# Patient Record
Sex: FEMALE | Race: WHITE | Employment: OTHER | URBAN - METROPOLITAN AREA
[De-identification: names, ages, dates, MRNs, and addresses within clinical notes are randomized per-mention and may not be internally consistent; named-entity substitution may affect disease eponyms.]

---

## 2020-04-23 ENCOUNTER — OFFICE VISIT (OUTPATIENT)
Dept: GYNECOLOGY | Age: 75
End: 2020-04-23

## 2020-04-23 VITALS
HEIGHT: 66 IN | DIASTOLIC BLOOD PRESSURE: 77 MMHG | SYSTOLIC BLOOD PRESSURE: 125 MMHG | TEMPERATURE: 97.7 F | WEIGHT: 152.8 LBS | BODY MASS INDEX: 24.56 KG/M2 | HEART RATE: 62 BPM

## 2020-04-23 DIAGNOSIS — R97.8 ELEVATED CA 19-9 LEVEL: ICD-10-CM

## 2020-04-23 DIAGNOSIS — E03.9 ACQUIRED HYPOTHYROIDISM: ICD-10-CM

## 2020-04-23 DIAGNOSIS — R97.0 ELEVATED CEA: ICD-10-CM

## 2020-04-23 DIAGNOSIS — R19.09 ABDOMINAL MASS OF OTHER SITE: ICD-10-CM

## 2020-04-23 DIAGNOSIS — R19.00 PELVIC MASS: Primary | ICD-10-CM

## 2020-04-23 DIAGNOSIS — I10 ESSENTIAL HYPERTENSION: ICD-10-CM

## 2020-04-23 RX ORDER — CALCIUM CARBONATE 300MG(750)
400 TABLET,CHEWABLE ORAL DAILY
COMMUNITY

## 2020-04-23 RX ORDER — PAROXETINE HYDROCHLORIDE 20 MG/1
20 TABLET, FILM COATED ORAL EVERY MORNING
COMMUNITY

## 2020-04-23 RX ORDER — GLUCOSAMINE SULFATE 1500 MG
1000 POWDER IN PACKET (EA) ORAL DAILY
COMMUNITY

## 2020-04-23 RX ORDER — OXYBUTYNIN CHLORIDE 10 MG/1
10 TABLET, EXTENDED RELEASE ORAL DAILY
COMMUNITY

## 2020-04-23 RX ORDER — LEVOTHYROXINE SODIUM 75 UG/1
TABLET ORAL
COMMUNITY
Start: 2020-03-09

## 2020-04-23 NOTE — PATIENT INSTRUCTIONS
[employer] Activation Thank you for requesting access to [employer]. Please follow the instructions below to securely access and download your online medical record. [employer] allows you to send messages to your doctor, view your test results, renew your prescriptions, schedule appointments, and more. How Do I Sign Up? 1. In your internet browser, go to [email or web address]. 2. Click on the First Time User? Click Here link in the Sign In box. You will see the New Member Sign Up page. 3. Enter your [employer] Access Code exactly as it appears below. You will not need to use this code after youve completed the sign-up process. If you do not sign up before the expiration date, you must request a new code. [employer] Access Code: [email or web address] Expires: 2020 11:40 AM (This is the date your [employer] access code will ) 4. Enter the last four digits of your Social Security Number (xxxx) and Date of Birth (mm/dd/yyyy) as indicated and click Submit. You will be taken to the next sign-up page. 5. Create a [employer] ID. This will be your [employer] login ID and cannot be changed, so think of one that is secure and easy to remember. 6. Create a [employer] password. You can change your password at any time. 7. Enter your Password Reset Question and Answer. This can be used at a later time if you forget your password. 8. Enter your e-mail address. You will receive e-mail notification when new information is available in [address] 9. Click Sign Up. You can now view and download portions of your medical record. 10. Click the Download Summary menu link to download a portable copy of your medical information. Additional Information If you have questions, please visit the Frequently Asked Questions section of the [employer] website at [email or web address] Remember, [employer] is NOT to be used for urgent needs. For medical emergencies, dial 911.

## 2020-04-23 NOTE — PROGRESS NOTES
New Patient, Referred by Dr. Edil Grove for pelvic mass, she states she is not having any pain, she has been bleeding from the vagina since September 2019, she states it is bright red blood and it is \"like my perios\", no clots, no nausea or vomiting, no fever or chills    1. Have you been to the ER, urgent care clinic since your last visit? Hospitalized since your last visit?  no    2. Have you seen or consulted any other health care providers outside of the [address] [place] [address] since your last visit? Include any pap smears or colon screening.   Yes, Dr. Solo Shall

## 2020-04-23 NOTE — LETTER
4/24/2020 12:23 PM 
 
Patient:  Justin Calderón YOB: 1945 Date of Visit: 4/23/2020 [facility], [address] [place] [address] VIA Facsimile: [phone] Dear Alexi Valiente DO, Thank you for referring Ms. Dane Cortez to [address] [place] [address] for evaluation and treatment. Please see my office note for my full discussion with the patient. Her case is interesting. In short I am concerned for a GI primary with metastatic disease based on her tumor markers. She will require further workup. We will plan for surgical resection after a CT scan and colonoscopy. If she has a primary GI malignancy we will plan for a joint procedure. Thank you very much for your referral of Ms. Dane Cortez. If you have questions, please do not hesitate to call me. I look forward to following Ms. Janelle Torres along with you and will keep you updated as to her progress.   
 
 
 
 
Sincerely, 
 
 
Shena Valdivia MD

## 2020-04-24 PROBLEM — R97.8 ELEVATED CA 19-9 LEVEL: Status: ACTIVE | Noted: 2020-04-24

## 2020-04-24 PROBLEM — R97.0 ELEVATED CEA: Status: ACTIVE | Noted: 2020-04-24

## 2020-04-24 PROBLEM — N95.0 PMB (POSTMENOPAUSAL BLEEDING): Status: ACTIVE | Noted: 2020-04-24

## 2020-04-24 PROBLEM — R19.00 PELVIC MASS: Status: ACTIVE | Noted: 2020-04-24

## 2020-04-24 NOTE — PROGRESS NOTES
[address] [facility] [address]  P [phone]  F [phone]    Office Note  Patient ID:  Name:  Jabier Jordan  MRN:  [id number]  :  1945/75 y.o. Date:  2020      HISTORY OF PRESENT ILLNESS:  Ms. Jabier Jodran is a 76 y.o.  postmenopausal female who presents in consultation from Dr. Nupur Elaine for PMB and large 25cm pelvic mass. The patient reports vaginal spotting that started in 2019 and has continued since that time. Denies bleeding like a period or everyday. Denies abdominal pain. Reports some bloating. Denies nausea or vomiting. Reports some constipation. Denies hematochezia. Unclear if change in stool caliber, but it may be thinner. 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 I do not have record of this. Reports last colonoscopy in ? [facility] Denies hematuria. Denies CP, SOB, fevers, or chills. The patient lives with her son and grandson. She is completely independent though at home. She presented to Dr. Nupur Elaine with reports of PMB and underwent a pelvic ultrasound on 3/3/2020 which demonstrated a 25cm pelvic mass and thickened endometrium of 5mm. She subsequently underwent a pelvic MRI on 2020 which noted a \"25.6cm multiloculated cystic neoplasm with the pelvis\". She underwent tumor markers on 3/27/2020. CEA elevated at 16.3, Ca 19-9 elevated at 761; Ca-125 normal at 18.8. She was subsequently referred to our office for further management. .     Pertinent PMH/PSH: h/o bowel resection (benign tumor?), HTN, ? Early dementia      Active, no restrictions. Imaging Review:   Pelvic MRI 2020:  FINDINGS: Within the lower abdomen and pelvis there is a large multiloculated cystic lesion measuring 25.6 x 14.2 x 20.5 cm. Numerous irregular internal septa are noted.  Several of these are mildly thickened and there is the suggestion of subtle mural nodularity. On the T1-weighted images these locules demonstrate variable signal intensity from markedly hyperintense to hypointense giving a \"stained glass\" appearance. 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 This is believed to be originating from the right ovary. The left ovary is poorly visualized and favored to be atrophic. The endometrium is abnormal in appearance. There is 7 mm of fluid centrally within the endometrial cavity. Both sides of the endometrium combine to an overall thickness of 13 mm. There is a small amount of free fluid within the pelvis. No adenopathy is identified. IMPRESSION:  1. There is a large 25.6 cm multiloculated cystic neoplasm within the pelvis. This is felt to be originating from the right ovary. 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 2. The endometrium is abnormally thickened at 13 mm. There is also fluid distending the endometrial cavity. Whether this represents endometrial hyperplasia versus endometrial malignancy is indeterminate. Pelvic ultrasound 3/3/2020:  FINDINGS:  UTERUS:  Size (cm): Length: 7.3  AP: 2.1 Width: 3.8  Myometrial Echo Pattern: Normal, suboptimally visualized. Contour: Normal.  ENDOMETRIAL ECHO COMPLEX: The endometrial cavity is normally collapsed with no fluid distention. Width: 4.6 mm. Please note that there is an endocervical hypoechoic lesion measuring 0.9 x 0.6 cm without definite internal flow. RIGHT OVARY:  Right ovary not visualized, either obscured by overlying viscera or surgically absent (to be correlated with surgical history). LEFT OVARY:  Left ovary not visualized, either obscured by overlying viscera or surgically absent (to be correlated with surgical history). OVARIAN DOPPLER: Not applicable as neither ovary was visualized. See above. ADNEXAL MASSES: There is a new cystic mass that is not fully visualized in the midline of the pelvis. The visualized portion measures 17.3 x 14.5 x 22.5 cm. There are thick internal septations with vascularity. There are also internal echoes that may represent mucinous material but remain indeterminate. This finding is highly suspicious for an ovarian neoplasm. However, please note that no normal ovarian tissue is identified. IMPRESSION:  1. New partially visualized cystic pelvic mass measuring at least up to 22.5 cm that is favored to represent an ovarian neoplasm. However, no normal ovarian tissue is identified on this examination, as the ovaries are likely obscured by the mass. MRI would be helpful for further evaluation. 2. Endocervical 0.9 cm lesion without definite internal flow. This finding remains concerning for an endocervical polyp or cervical neoplasm. 3. Borderline thickened endometrium measuring 5 mm. ROS:  A comprehensive review of systems was negative except for that written in the History of Present Illness.  , 10 point ROS    OB/GYN ROS:  Per HPI    ECOG rdGrdrrdarddrderd:rd rd3rd Problem List:  Patient Active Problem List    Diagnosis Date Noted    Pelvic mass 04/24/2020    Elevated CEA 04/24/2020    Elevated CA 19-9 level 04/24/2020    PMB (postmenopausal bleeding) 04/24/2020    Hypothyroidism     Hypertension     Hypercholesteremia     High cholesterol     GERD (gastroesophageal reflux disease)     Depression      PMH:  Past Medical History:   Diagnosis Date    Depression     GERD (gastroesophageal reflux disease)     High cholesterol     Hypercholesteremia     Hypertension     Hypothyroidism       PSH:  Past Surgical History:   Procedure Laterality Date    HX OTHER SURGICAL  2009    Tumor removed from colon, benign    HX TUBAL LIGATION        Social History:  Social History     Tobacco Use    Smoking status: Never Smoker    Smokeless tobacco: Never Used   Substance Use Topics    Alcohol use: Not Currently      Family History:  Family History   Problem Relation Age of Onset    Breast Cancer Mother     Depression Mother       Medications: (reviewed)  Current Outpatient Medications   Medication Sig    cholecalciferol (VITAMIN D3) 25 mcg (1,000 unit) cap Take 1,000 Units by mouth daily.  levothyroxine (SYNTHROID) 75 mcg tablet TK 1 T PO DAILY    magnesium oxide 400 mg magnesium tab 400 mg daily.  oxybutynin chloride XL (DITROPAN XL) 10 mg CR tablet Take 10 mg by mouth daily.  PARoxetine (PAXIL) 20 mg tablet Take 20 mg by mouth Every morning.  ascorbic acid (VITAMIN C PO) Take  by mouth. No current facility-administered medications for this visit. Allergies: (reviewed)  No Known Allergies     Gyn History:   Last pap: unsure of when  History of abnormal pap: denies      OBJECTIVE:    Physical Exam:  VITAL SIGNS: Vitals:    04/23/20 1141   BP: 125/77   Pulse: 62   Temp: 97.7 °F (36.5 °C)   TempSrc: Oral   Weight: 152 lb 12.8 oz (69.3 kg)   Height: 5' 6\" (1.676 m)     Body mass index is 24.66 kg/m². GENERAL RUBI: Conversant, alert, oriented. No acute distress. HEENT: HEENT. No thyroid enlargement. No JVD. Neck: Supple without restrictions. RESPIRATORY: Clear to auscultation and percussion to the bases. No CVAT. CARDIOVASC: RRR without murmur/rub. GASTROINT: Soft, nontender, large 25cm mass in the midline - moderately mobile   MUSCULOSKEL: no joint tenderness, deformity or swelling       EXTREMITIES: extremities normal, atraumatic, no cyanosis or edema   PELVIC: Exam chaperoned by nurse. Normal appearing external genitalia. On speculum exam, normal appearing vagina. Patient somewhat intolerant of exam so the cervix was not visualized as it was displaced superiorly because of the mass. On bimanual exam, the cervix and uterus are normal size and mobile. There is a moderately mobile 25cm mass. No evidence of nodularity. RECTAL: deferred   SANKET SURVEY: No suspicious lymphadenopathy or edema noted. NEURO: Grossly intact. No acute deficit.        Lab Date as available:    No results found for: WBC, HGB, HCT, PLT, MCV, HGBEXT, HCTEXT, PLTEXT, HGBEXT, HCTEXT, PLTEXT  No results found for: NA, K, CL, CO2, AGAP, GLU, BUN, CREA, BUCR, GFRAA, GFRNA, CA      IMPRESSION/PLAN:    Ms. Tanya Phillips is a 76 y.o. female with a working diagnosis of 25cm pelvic mass, PMB, elevated CEA, and elevated Ca 19-9. Problems:     Patient Active Problem List    Diagnosis Date Noted    Pelvic mass 04/24/2020    Elevated CEA 04/24/2020    Elevated CA 19-9 level 04/24/2020    PMB (postmenopausal bleeding) 04/24/2020    Hypothyroidism     Hypertension     Hypercholesteremia     High cholesterol     GERD (gastroesophageal reflux disease)     Depression        I reviewed Ms. Sherryle Greenhouse Pritchard's course to date, including her medical records, recent studies, physical exam, and review of symptoms. In regard to her PMB, I believe this is likely the least of her problems. I did not recommend an endometrial biopsy today, as this would be technically difficult and would not change her ongoing management. Given her elevated CEA and Ca 19-9 (and normal Ca-125), I am concerned for a possible GI malignancy with metastatic involvement of the ovary. She has only had a MRI pelvis at this time. I have recommended a CT C/A/P for further evaluation, along with a colonoscopy. Her history of a bowel resection for a benign tumor is curious. Unclear where she had this done. Prior to consideration of surgical resection of this mass, the patient will undergo a CT C/A/P and a colonoscopy, as I am concerned she will need a joint procedure. The patient had some difficulty comprehending all of this, so I did not go into great detail about what would happen after that. Luckily the patient was here today with her niece who was able to understand where we were and where we were going. We will plan for a video-conference following her CT and colonoscopy. Orders and referrals placed today.  Given they are from [facility], they will also send a CD with the images for her MRI and CT scan for review. I expressed my concerns that surgery will certainly be needed to resect this mass, but given her constellation of findings and tumor markers that further workup is required to ensure we don't miss a primary GI malignancy. In regard to her PMB, we will likely plan for either an EMB in the future versus merely frozen pathology of her uterus at the time of a surgery to remove the pelvic mass. As noted earlier, I do not believe her PMB is related to his pelvic mass and is likely just coincidental. All questions and concerns were addressed with the patient and she is comfortable with the plan.      Defined Sensitive Document    >50% of total time allocated to visit dedicated to counseling, 60 minutes total.    Signed By: Vishal Reddy MD     4/24/2020/11:59 AM

## 2020-06-04 ENCOUNTER — TELEPHONE (OUTPATIENT)
Dept: GYNECOLOGY | Age: 75
End: 2020-06-04

## 2020-06-04 NOTE — TELEPHONE ENCOUNTER
I called Ms. Shefali Nixon to discuss her CT A/P on 5/4/2020 results that just arrived today, along with her recent EGD and colonoscopy from 5/15/2020. No lesions identified. While her CEA and Ca 19-9 are elevated, there are no clear findings on her CT other than her large pelvic mass. I have recommended proceeding with an exploratory laparotomy, STEPHANY/BSO, resection of mass and possible staging/debulking based on frozen pathology at the time of surgery. Given her PMB, we will also plan on frozen pathology of the uterus. Given the size of the mass this cannot be accomplished laparoscopically. I spoke with the patient's niece today who will talk with her aunt about when she would like to schedule her surgery. We will plan for a virtual visit next week so we can coordinate and be sure that all of their questions are answered.      Liana Barrientos MD

## 2020-06-11 ENCOUNTER — VIRTUAL VISIT (OUTPATIENT)
Dept: GYNECOLOGY | Age: 75
End: 2020-06-11

## 2020-06-11 DIAGNOSIS — N95.0 PMB (POSTMENOPAUSAL BLEEDING): ICD-10-CM

## 2020-06-11 DIAGNOSIS — R97.0 ELEVATED CEA: ICD-10-CM

## 2020-06-11 DIAGNOSIS — R19.00 PELVIC MASS: ICD-10-CM

## 2020-06-11 DIAGNOSIS — R97.8 ELEVATED CA 19-9 LEVEL: Primary | ICD-10-CM

## 2020-06-11 NOTE — PROGRESS NOTES
Consult to discuss surgery, virtual visit, phone only    1. Have you been to the ER, urgent care clinic since your last visit? Hospitalized since your last visit?  no    2. Have you seen or consulted any other health care providers outside of the [address] [place] [address] since your last visit? Include any pap smears or colon screening.      no

## 2020-06-15 NOTE — PROGRESS NOTES
Mairluz Dyer is a 76 y.o. female who was seen by synchronous (real-time) audio-video technology on 6/15/2020    [id number] [facility],[address] [facility] [address]  P [phone]  F [phone]    Office Note  Patient ID:  Name:  Mariluz Dyer  MRN:  [id number]  :  1945/75 y.o. Date:  6/15/2020      HISTORY OF PRESENT ILLNESS:  Ms. Mariluz Dyer is a 76 y.o.  postmenopausal female who presents in consultation from Dr. Edil Grove for PMB and large 25cm pelvic mass. The patient reports vaginal spotting that started in 2019 and has continued since that time. Denies bleeding like a period or everyday. Denies abdominal pain. Reports some bloating. Denies nausea or vomiting. Reports some constipation. Denies hematochezia. Unclear if change in stool caliber, but it may be thinner. 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 I do not have record of this. Reports last colonoscopy in ? Glenis Dominguezgo Denies hematuria. Denies CP, SOB, fevers, or chills. The patient lives with her son and grandson. She is completely independent though at home. She presented to Dr. Edil Grove with reports of PMB and underwent a pelvic ultrasound on 3/3/2020 which demonstrated a 25cm pelvic mass and thickened endometrium of 5mm. She subsequently underwent a pelvic MRI on 2020 which noted a \"25.6cm multiloculated cystic neoplasm with the pelvis\". She underwent tumor markers on 3/27/2020. CEA elevated at 16.3, Ca 19-9 elevated at 761; Ca-125 normal at 18.8. She was subsequently referred to our office for further management. .     Pertinent PMH/PSH: h/o bowel resection (benign tumor?), HTN, ? Early dementia      Active, no restrictions. Imaging Review:   Pelvic MRI 2020:  FINDINGS: Within the lower abdomen and pelvis there is a large multiloculated cystic lesion measuring 25.6 x 14.2 x 20.5 cm. Numerous irregular internal septa are noted. Several of these are mildly thickened and there is the suggestion of subtle mural nodularity. On the T1-weighted images these locules demonstrate variable signal intensity from markedly hyperintense to hypointense giving a \"stained glass\" appearance. 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 This is believed to be originating from the right ovary. The left ovary is poorly visualized and favored to be atrophic. The endometrium is abnormal in appearance. There is 7 mm of fluid centrally within the endometrial cavity. Both sides of the endometrium combine to an overall thickness of 13 mm. There is a small amount of free fluid within the pelvis. No adenopathy is identified. IMPRESSION:  1. There is a large 25.6 cm multiloculated cystic neoplasm within the pelvis. This is felt to be originating from the right ovary. 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 2. The endometrium is abnormally thickened at 13 mm. There is also fluid distending the endometrial cavity. Whether this represents endometrial hyperplasia versus endometrial malignancy is indeterminate. Pelvic ultrasound 3/3/2020:  FINDINGS:  UTERUS:  Size (cm): Length: 7.3  AP: 2.1 Width: 3.8  Myometrial Echo Pattern: Normal, suboptimally visualized. Contour: Normal.  ENDOMETRIAL ECHO COMPLEX: The endometrial cavity is normally collapsed with no fluid distention. Width: 4.6 mm. Please note that there is an endocervical hypoechoic lesion measuring 0.9 x 0.6 cm without definite internal flow. RIGHT OVARY:  Right ovary not visualized, either obscured by overlying viscera or surgically absent (to be correlated with surgical history). LEFT OVARY:  Left ovary not visualized, either obscured by overlying viscera or surgically absent (to be correlated with surgical history).   OVARIAN DOPPLER: Not applicable as neither ovary was visualized. See above. ADNEXAL MASSES: There is a new cystic mass that is not fully visualized in the midline of the pelvis. The visualized portion measures 17.3 x 14.5 x 22.5 cm. There are thick internal septations with vascularity. There are also internal echoes that may represent mucinous material but remain indeterminate. This finding is highly suspicious for an ovarian neoplasm. However, please note that no normal ovarian tissue is identified. IMPRESSION:  1. New partially visualized cystic pelvic mass measuring at least up to 22.5 cm that is favored to represent an ovarian neoplasm. However, no normal ovarian tissue is identified on this examination, as the ovaries are likely obscured by the mass. MRI would be helpful for further evaluation. 2. Endocervical 0.9 cm lesion without definite internal flow. This finding remains concerning for an endocervical polyp or cervical neoplasm. 3. Borderline thickened endometrium measuring 5 mm. ROS:  A comprehensive review of systems was negative except for that written in the History of Present Illness.  , 10 point ROS    OB/GYN ROS:  Per HPI    ECOG rdGrdrrdarddrderd:rd rd3rd Problem List:  Patient Active Problem List    Diagnosis Date Noted    Pelvic mass 04/24/2020    Elevated CEA 04/24/2020    Elevated CA 19-9 level 04/24/2020    PMB (postmenopausal bleeding) 04/24/2020    Hypothyroidism     Hypertension     Hypercholesteremia     High cholesterol     GERD (gastroesophageal reflux disease)     Depression      PMH:  Past Medical History:   Diagnosis Date    Depression     GERD (gastroesophageal reflux disease)     High cholesterol     Hypercholesteremia     Hypertension     Hypothyroidism       PSH:  Past Surgical History:   Procedure Laterality Date    HX OTHER SURGICAL  2009    Tumor removed from colon, benign    HX TUBAL LIGATION        Social History:  Social History     Tobacco Use    Smoking status: Never Smoker    Smokeless tobacco: Never Used Substance Use Topics    Alcohol use: Not Currently      Family History:  Family History   Problem Relation Age of Onset   [de-identified] Breast Cancer Mother     Depression Mother       Medications: (reviewed)  Current Outpatient Medications   Medication Sig    cholecalciferol (VITAMIN D3) 25 mcg (1,000 unit) cap Take 1,000 Units by mouth daily.  levothyroxine (SYNTHROID) 75 mcg tablet TK 1 T PO DAILY    magnesium oxide 400 mg magnesium tab 400 mg daily.  oxybutynin chloride XL (DITROPAN XL) 10 mg CR tablet Take 10 mg by mouth daily.  PARoxetine (PAXIL) 20 mg tablet Take 20 mg by mouth Every morning.  ascorbic acid (VITAMIN C PO) Take  by mouth. No current facility-administered medications for this visit. Allergies: (reviewed)  No Known Allergies     Gyn History:   Last pap: unsure of when  History of abnormal pap: denies      OBJECTIVE:    Physical Exam:  VITAL SIGNS: There were no vitals filed for this visit. There is no height or weight on file to calculate BMI. GENERAL RUBI: Conversant, alert, oriented. No acute distress. HEENT: HEENT. No thyroid enlargement. No JVD. Neck: Supple without restrictions. RESPIRATORY: Clear to auscultation and percussion to the bases. No CVAT. CARDIOVASC: RRR without murmur/rub. GASTROINT: Soft, nontender, large 25cm mass in the midline - moderately mobile   MUSCULOSKEL: no joint tenderness, deformity or swelling       EXTREMITIES: extremities normal, atraumatic, no cyanosis or edema   PELVIC: Exam chaperoned by nurse. Normal appearing external genitalia. On speculum exam, normal appearing vagina. Patient somewhat intolerant of exam so the cervix was not visualized as it was displaced superiorly because of the mass. On bimanual exam, the cervix and uterus are normal size and mobile. There is a moderately mobile 25cm mass. No evidence of nodularity. RECTAL: deferred   SANKET SURVEY: No suspicious lymphadenopathy or edema noted. NEURO: Grossly intact.  No acute deficit. Lab Date as available:    No results found for: WBC, HGB, HCT, PLT, MCV, HGBEXT, HCTEXT, PLTEXT, HGBEXT, HCTEXT, PLTEXT  No results found for: NA, K, CL, CO2, AGAP, GLU, BUN, CREA, BUCR, GFRAA, GFRNA, CA      IMPRESSION/PLAN:    Ms. Donnie Toure is a 76 y.o. female with a working diagnosis of 25cm pelvic mass, PMB, elevated CEA, and elevated Ca 19-9. Problems:     Patient Active Problem List    Diagnosis Date Noted    Pelvic mass 04/24/2020    Elevated CEA 04/24/2020    Elevated CA 19-9 level 04/24/2020    PMB (postmenopausal bleeding) 04/24/2020    Hypothyroidism     Hypertension     Hypercholesteremia     High cholesterol     GERD (gastroesophageal reflux disease)     Depression      Reviewed patient's course to date, including her recent normal EGD and colonoscopy. Given her elevated CEA and Ca 19-9 I am still concerned for a possible GI malignancy, but I have recommended proceeding with resection. 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 Her pathology report is within [facility] and states no evidence of malignancy at that time. In regard to her PMB, I believe this is likely the least of her problems; although we will plan to proceed with a hysterectomy at the time of her surgery with frozen pathology as well. The patient has some difficulty comprehending all of these things, and today's visit was mostly voiced with the help of her sister. CT A/P  On 5/4/2020 demonstrates the large complex mass, but otherwise does not see any other concerning findings. I have recommended proceeding with an exploratory laparotomy, resection of mass, STEPHANY/BSO, possible staging/debulking based on frozen pathology at the time of surgery. The patient is not sure today if she would want to undergo a surgery, which is not ideal but understandable given her age.  She would like to discuss this over with her sister and niece and then will contact our office for further follow-up or scheduling of her surgery. We will plan to touch based next week. All questions and concerns were addressed with the patient and she is comfortable with the plan. Kyler Pearce MD      Pursuant to the emergency declaration unde the [facility], [facility] authority and the [employer] Act, this Virtual Visit was conducted, with patient's consent, to reduce the patient's risk of exposure to COVID-19 and provide continuity of care for an established patient. Services were provided through a video synchronous discussion virtually to substitute for in-person clinic visit.      I spent at least 40 minutes with this established patient, and >50% of the time was spent counseling and/or coordinating care regarding pelvic mass    Kyler Pearce MD

## 2020-06-15 NOTE — H&P (VIEW-ONLY)
Tereso Henyr is a 76 y.o. female who was seen by synchronous (real-time) audio-video technology on 6/15/2020 [address] [facility], [address] [place] [address] [address] 
P [phone]  F [phone] Office Note Patient ID: 
Name:  Tereso Henry MRN:  [id number] :  1945/75 y.o. Date:  6/15/2020 HISTORY OF PRESENT ILLNESS: 
Ms. Tereso Henry is a 76 y.o.  postmenopausal female who presents in consultation from Dr. Lenox Boast for PMB and large 25cm pelvic mass. The patient reports vaginal spotting that started in 2019 and has continued since that time. Denies bleeding like a period or everyday. Denies abdominal pain. Reports some bloating. Denies nausea or vomiting. Reports some constipation. Denies hematochezia. Unclear if change in stool caliber, but it may be thinner. 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 I do not have record of this. Reports last colonoscopy in ? Lisbeth Kirby Denies hematuria. Denies CP, SOB, fevers, or chills. The patient lives with her son and grandson. She is completely independent though at home. She presented to Dr. Lenox Boast with reports of PMB and underwent a pelvic ultrasound on 3/3/2020 which demonstrated a 25cm pelvic mass and thickened endometrium of 5mm. She subsequently underwent a pelvic MRI on 2020 which noted a \"25.6cm multiloculated cystic neoplasm with the pelvis\". She underwent tumor markers on 3/27/2020. CEA elevated at 16.3, Ca 19-9 elevated at 761; Ca-125 normal at 18.8. She was subsequently referred to our office for further management. Lisbeth Kirby Pertinent PMH/PSH: h/o bowel resection (benign tumor?), HTN, ? Early dementia Active, no restrictions.   
 
Imaging Review:  
Pelvic MRI 2020: 
FINDINGS: Within the lower abdomen and pelvis there is a large multiloculated cystic lesion measuring 25.6 x 14.2 x 20.5 cm. Numerous irregular internal septa are noted. Several of these are mildly thickened and there is the suggestion of subtle mural nodularity. On the T1-weighted images these locules demonstrate variable signal intensity from markedly hyperintense to hypointense giving a \"stained glass\" appearance. 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 This is believed to be originating from the right ovary. The left ovary is poorly visualized and favored to be atrophic. The endometrium is abnormal in appearance. There is 7 mm of fluid centrally within the endometrial cavity. Both sides of the endometrium combine to an overall thickness of 13 mm. There is a small amount of free fluid within the pelvis. No adenopathy is identified. IMPRESSION: 
1. There is a large 25.6 cm multiloculated cystic neoplasm within the pelvis. This is felt to be originating from the right ovary. 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 2. The endometrium is abnormally thickened at 13 mm. There is also fluid distending the endometrial cavity. Whether this represents endometrial hyperplasia versus endometrial malignancy is indeterminate. Pelvic ultrasound 3/3/2020: 
FINDINGS: 
UTERUS:  Size (cm): Length: 7.3  AP: 2.1 Width: 3.8 Myometrial Echo Pattern: Normal, suboptimally visualized. Contour: Normal. 
ENDOMETRIAL ECHO COMPLEX: The endometrial cavity is normally collapsed with no fluid distention. Width: 4.6 mm. Please note that there is an endocervical hypoechoic lesion measuring 0.9 x 0.6 cm without definite internal flow. RIGHT OVARY:  Right ovary not visualized, either obscured by overlying viscera or surgically absent (to be correlated with surgical history).  
LEFT OVARY:  Left ovary not visualized, either obscured by overlying viscera or surgically absent (to be correlated with surgical history). OVARIAN DOPPLER: Not applicable as neither ovary was visualized. See above. ADNEXAL MASSES: There is a new cystic mass that is not fully visualized in the midline of the pelvis. The visualized portion measures 17.3 x 14.5 x 22.5 cm. There are thick internal septations with vascularity. There are also internal echoes that may represent mucinous material but remain indeterminate. This finding is highly suspicious for an ovarian neoplasm. However, please note that no normal ovarian tissue is identified. IMPRESSION: 
1. New partially visualized cystic pelvic mass measuring at least up to 22.5 cm that is favored to represent an ovarian neoplasm. However, no normal ovarian tissue is identified on this examination, as the ovaries are likely obscured by the mass. MRI would be helpful for further evaluation. 2. Endocervical 0.9 cm lesion without definite internal flow. This finding remains concerning for an endocervical polyp or cervical neoplasm. 3. Borderline thickened endometrium measuring 5 mm. ROS: 
A comprehensive review of systems was negative except for that written in the History of Present Illness. , 10 point ROS 
 
OB/GYN ROS: 
Per HPI 
 
ECOG stGstrstastdstest:st st1st Problem List: 
Patient Active Problem List  
 Diagnosis Date Noted  Pelvic mass 04/24/2020  Elevated CEA 04/24/2020  Elevated CA 19-9 level 04/24/2020  PMB (postmenopausal bleeding) 04/24/2020  Hypothyroidism  Hypertension  Hypercholesteremia  High cholesterol  GERD (gastroesophageal reflux disease)  Depression PMH: 
Past Medical History:  
Diagnosis Date  Depression  GERD (gastroesophageal reflux disease)  High cholesterol  Hypercholesteremia  Hypertension  Hypothyroidism PSH: 
Past Surgical History:  
Procedure Laterality Date  HX OTHER SURGICAL  2009 Tumor removed from colon, benign  HX TUBAL LIGATION Social History: 
Social History Tobacco Use  Smoking status: Never Smoker  Smokeless tobacco: Never Used Substance Use Topics  Alcohol use: Not Currently Family History: 
Family History Problem Relation Age of Onset  Breast Cancer Mother  Depression Mother Medications: (reviewed) Current Outpatient Medications Medication Sig  cholecalciferol (VITAMIN D3) 25 mcg (1,000 unit) cap Take 1,000 Units by mouth daily.  levothyroxine (SYNTHROID) 75 mcg tablet TK 1 T PO DAILY  magnesium oxide 400 mg magnesium tab 400 mg daily.  oxybutynin chloride XL (DITROPAN XL) 10 mg CR tablet Take 10 mg by mouth daily.  PARoxetine (PAXIL) 20 mg tablet Take 20 mg by mouth Every morning.  ascorbic acid (VITAMIN C PO) Take  by mouth. No current facility-administered medications for this visit. Allergies: (reviewed) No Known Allergies Gyn History:  
Last pap: unsure of when History of abnormal pap: denies OBJECTIVE: 
 
Physical Exam: VITAL SIGNS: There were no vitals filed for this visit. There is no height or weight on file to calculate BMI. GENERAL RUBI: Conversant, alert, oriented. No acute distress. HEENT: HEENT. No thyroid enlargement. No JVD. Neck: Supple without restrictions. RESPIRATORY: Clear to auscultation and percussion to the bases. No CVAT. CARDIOVASC: RRR without murmur/rub. GASTROINT: Soft, nontender, large 25cm mass in the midline - moderately mobile MUSCULOSKEL: no joint tenderness, deformity or swelling EXTREMITIES: extremities normal, atraumatic, no cyanosis or edema PELVIC: Exam chaperoned by nurse. Normal appearing external genitalia. On speculum exam, normal appearing vagina. Patient somewhat intolerant of exam so the cervix was not visualized as it was displaced superiorly because of the mass.  On bimanual exam, the cervix and uterus are normal size and mobile. There is a moderately mobile 25cm mass. No evidence of nodularity. RECTAL: deferred SANKET SURVEY: No suspicious lymphadenopathy or edema noted. NEURO: Grossly intact. No acute deficit. Lab Date as available: No results found for: WBC, HGB, HCT, PLT, MCV, HGBEXT, HCTEXT, PLTEXT, HGBEXT, HCTEXT, PLTEXT No results found for: NA, K, CL, CO2, AGAP, GLU, BUN, CREA, BUCR, GFRAA, GFRNA, CA IMPRESSION/PLAN: 
 
Ms. Александр Gann is a 76 y.o. female with a working diagnosis of 25cm pelvic mass, PMB, elevated CEA, and elevated Ca 19-9. Problems: 
  
Patient Active Problem List  
 Diagnosis Date Noted  Pelvic mass 04/24/2020  Elevated CEA 04/24/2020  Elevated CA 19-9 level 04/24/2020  PMB (postmenopausal bleeding) 04/24/2020  Hypothyroidism  Hypertension  Hypercholesteremia  High cholesterol  GERD (gastroesophageal reflux disease)  Depression Reviewed patient's course to date, including her recent normal EGD and colonoscopy. Given her elevated CEA and Ca 19-9 I am still concerned for a possible GI malignancy, but I have recommended proceeding with resection. 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 Her pathology report is within [facility] and states no evidence of malignancy at that time. In regard to her PMB, I believe this is likely the least of her problems; although we will plan to proceed with a hysterectomy at the time of her surgery with frozen pathology as well. The patient has some difficulty comprehending all of these things, and today's visit was mostly voiced with the help of her sister. CT A/P  On 5/4/2020 demonstrates the large complex mass, but otherwise does not see any other concerning findings.  I have recommended proceeding with an exploratory laparotomy, resection of mass, STEPHANY/BSO, possible staging/debulking based on frozen pathology at the time of surgery. The patient is not sure today if she would want to undergo a surgery, which is not ideal but understandable given her age. She would like to discuss this over with her sister and niece and then will contact our office for further follow-up or scheduling of her surgery. We will plan to touch based next week. All questions and concerns were addressed with the patient and she is comfortable with the plan. Kiah Marcos MD 
 
 
Pursuant to the emergency declaration unde the [facility], [facility] waiver authority and the [employer] Act, this Virtual Visit was conducted, with patient's consent, to reduce the patient's risk of exposure to COVID-19 and provide continuity of care for an established patient. Services were provided through a video synchronous discussion virtually to substitute for in-person clinic visit. I spent at least 40 minutes with this established patient, and >50% of the time was spent counseling and/or coordinating care regarding pelvic mass Kiah Marcos MD

## 2020-06-25 ENCOUNTER — HOSPITAL ENCOUNTER (OUTPATIENT)
Dept: PREADMISSION TESTING | Age: 75
Discharge: HOME | End: 2020-06-25
Attending: PHYSICIAN ASSISTANT
Payer: MEDICARE

## 2020-06-25 DIAGNOSIS — Z20.822 ENCOUNTER FOR LABORATORY TESTING FOR COVID-19 VIRUS: ICD-10-CM

## 2020-06-25 PROCEDURE — 87635 SARS-COV-2 COVID-19 AMP PRB: CPT

## 2020-06-26 LAB — SARS-COV-2, COV2NT: NOT DETECTED

## 2020-06-28 ENCOUNTER — ANESTHESIA EVENT (OUTPATIENT)
Dept: SURGERY | Age: 75
DRG: 743 | End: 2020-06-28
Payer: MEDICARE

## 2020-06-29 ENCOUNTER — ANESTHESIA (OUTPATIENT)
Dept: SURGERY | Age: 75
DRG: 743 | End: 2020-06-29
Payer: MEDICARE

## 2020-06-29 ENCOUNTER — HOSPITAL ENCOUNTER (INPATIENT)
Age: 75
LOS: 5 days | Discharge: HOME | DRG: 743 | End: 2020-07-04
Attending: OBSTETRICS & GYNECOLOGY | Admitting: OBSTETRICS & GYNECOLOGY
Payer: MEDICARE

## 2020-06-29 ENCOUNTER — APPOINTMENT (OUTPATIENT)
Dept: GENERAL RADIOLOGY | Age: 75
DRG: 743 | End: 2020-06-29
Attending: OBSTETRICS & GYNECOLOGY
Payer: MEDICARE

## 2020-06-29 DIAGNOSIS — G89.18 POSTOPERATIVE PAIN: Primary | ICD-10-CM

## 2020-06-29 PROBLEM — R19.00 PELVIC MASS IN FEMALE: Status: ACTIVE | Noted: 2020-06-29

## 2020-06-29 LAB
ABO + RH BLD: NORMAL
ALBUMIN SERPL-MCNC: 3.8 G/DL (ref 3.5–5)
ALBUMIN/GLOB SERPL: 1.7 {RATIO} (ref 1.1–2.2)
ALP SERPL-CCNC: 58 U/L (ref 45–117)
ALT SERPL-CCNC: 18 U/L (ref 12–78)
ANION GAP SERPL CALC-SCNC: 7 MMOL/L (ref 5–15)
AST SERPL-CCNC: 13 U/L (ref 15–37)
BASOPHILS # BLD: 0.1 K/UL (ref 0–0.1)
BASOPHILS NFR BLD: 1 % (ref 0–1)
BILIRUB SERPL-MCNC: 0.5 MG/DL (ref 0.2–1)
BLOOD GROUP ANTIBODIES SERPL: NORMAL
BUN SERPL-MCNC: 23 MG/DL (ref 6–20)
BUN/CREAT SERPL: 34 (ref 12–20)
CALCIUM SERPL-MCNC: 8.9 MG/DL (ref 8.5–10.1)
CHLORIDE SERPL-SCNC: 111 MMOL/L (ref 97–108)
CO2 SERPL-SCNC: 25 MMOL/L (ref 21–32)
CREAT SERPL-MCNC: 0.68 MG/DL (ref 0.55–1.02)
DIFFERENTIAL METHOD BLD: ABNORMAL
EOSINOPHIL # BLD: 0.1 K/UL (ref 0–0.4)
EOSINOPHIL NFR BLD: 3 % (ref 0–7)
ERYTHROCYTE [DISTWIDTH] IN BLOOD BY AUTOMATED COUNT: 13.5 % (ref 11.5–14.5)
GLOBULIN SER CALC-MCNC: 2.3 G/DL (ref 2–4)
GLUCOSE SERPL-MCNC: 85 MG/DL (ref 65–100)
HCT VFR BLD AUTO: 44.3 % (ref 35–47)
HGB BLD-MCNC: 14.1 G/DL (ref 11.5–16)
IMM GRANULOCYTES # BLD AUTO: 0 K/UL (ref 0–0.04)
IMM GRANULOCYTES NFR BLD AUTO: 0 % (ref 0–0.5)
LYMPHOCYTES # BLD: 1.4 K/UL (ref 0.8–3.5)
LYMPHOCYTES NFR BLD: 29 % (ref 12–49)
MCH RBC QN AUTO: 30.3 PG (ref 26–34)
MCHC RBC AUTO-ENTMCNC: 31.8 G/DL (ref 30–36.5)
MCV RBC AUTO: 95.3 FL (ref 80–99)
MONOCYTES # BLD: 0.5 K/UL (ref 0–1)
MONOCYTES NFR BLD: 11 % (ref 5–13)
NEUTS SEG # BLD: 2.7 K/UL (ref 1.8–8)
NEUTS SEG NFR BLD: 56 % (ref 32–75)
NRBC # BLD: 0 K/UL (ref 0–0.01)
NRBC BLD-RTO: 0 PER 100 WBC
PLATELET # BLD AUTO: 112 K/UL (ref 150–400)
PMV BLD AUTO: 12.8 FL (ref 8.9–12.9)
POTASSIUM SERPL-SCNC: 4.1 MMOL/L (ref 3.5–5.1)
PROT SERPL-MCNC: 6.1 G/DL (ref 6.4–8.2)
RBC # BLD AUTO: 4.65 M/UL (ref 3.8–5.2)
SODIUM SERPL-SCNC: 143 MMOL/L (ref 136–145)
SPECIMEN EXP DATE BLD: NORMAL
WBC # BLD AUTO: 4.8 K/UL (ref 3.6–11)

## 2020-06-29 PROCEDURE — 0WBN0ZX EXCISION OF FEMALE PERINEUM, OPEN APPROACH, DIAGNOSTIC: ICD-10-PCS | Performed by: OBSTETRICS & GYNECOLOGY

## 2020-06-29 PROCEDURE — 77030013079 HC BLNKT BAIR HGGR 3M -A: Performed by: ANESTHESIOLOGY

## 2020-06-29 PROCEDURE — 74011250637 HC RX REV CODE- 250/637: Performed by: ANESTHESIOLOGY

## 2020-06-29 PROCEDURE — 76010000131 HC OR TIME 2 TO 2.5 HR: Performed by: OBSTETRICS & GYNECOLOGY

## 2020-06-29 PROCEDURE — 77030026438 HC STYL ET INTUB CARD -A: Performed by: ANESTHESIOLOGY

## 2020-06-29 PROCEDURE — 74011250637 HC RX REV CODE- 250/637: Performed by: PHYSICIAN ASSISTANT

## 2020-06-29 PROCEDURE — 65410000002 HC RM PRIVATE OB

## 2020-06-29 PROCEDURE — 0TN70ZZ RELEASE LEFT URETER, OPEN APPROACH: ICD-10-PCS | Performed by: OBSTETRICS & GYNECOLOGY

## 2020-06-29 PROCEDURE — 77030010507 HC ADH SKN DERMBND J&J -B: Performed by: OBSTETRICS & GYNECOLOGY

## 2020-06-29 PROCEDURE — 0UT20ZZ RESECTION OF BILATERAL OVARIES, OPEN APPROACH: ICD-10-PCS | Performed by: OBSTETRICS & GYNECOLOGY

## 2020-06-29 PROCEDURE — 77030002966 HC SUT PDS J&J -A: Performed by: OBSTETRICS & GYNECOLOGY

## 2020-06-29 PROCEDURE — 88331 PATH CONSLTJ SURG 1 BLK 1SPC: CPT

## 2020-06-29 PROCEDURE — 80053 COMPREHEN METABOLIC PANEL: CPT

## 2020-06-29 PROCEDURE — 74011000250 HC RX REV CODE- 250: Performed by: NURSE ANESTHETIST, CERTIFIED REGISTERED

## 2020-06-29 PROCEDURE — 88112 CYTOPATH CELL ENHANCE TECH: CPT

## 2020-06-29 PROCEDURE — 88305 TISSUE EXAM BY PATHOLOGIST: CPT

## 2020-06-29 PROCEDURE — 74011000258 HC RX REV CODE- 258: Performed by: PHYSICIAN ASSISTANT

## 2020-06-29 PROCEDURE — 0UT70ZZ RESECTION OF BILATERAL FALLOPIAN TUBES, OPEN APPROACH: ICD-10-PCS | Performed by: OBSTETRICS & GYNECOLOGY

## 2020-06-29 PROCEDURE — 77030011264 HC ELECTRD BLD EXT COVD -A: Performed by: OBSTETRICS & GYNECOLOGY

## 2020-06-29 PROCEDURE — 77030011640 HC PAD GRND REM COVD -A: Performed by: OBSTETRICS & GYNECOLOGY

## 2020-06-29 PROCEDURE — 74011250636 HC RX REV CODE- 250/636: Performed by: PHYSICIAN ASSISTANT

## 2020-06-29 PROCEDURE — 71045 X-RAY EXAM CHEST 1 VIEW: CPT

## 2020-06-29 PROCEDURE — 77030040830 HC CATH URETH FOL MDII -A: Performed by: OBSTETRICS & GYNECOLOGY

## 2020-06-29 PROCEDURE — 77030040361 HC SLV COMPR DVT MDII -B: Performed by: OBSTETRICS & GYNECOLOGY

## 2020-06-29 PROCEDURE — 0DBU0ZZ EXCISION OF OMENTUM, OPEN APPROACH: ICD-10-PCS | Performed by: OBSTETRICS & GYNECOLOGY

## 2020-06-29 PROCEDURE — 76210000017 HC OR PH I REC 1.5 TO 2 HR: Performed by: OBSTETRICS & GYNECOLOGY

## 2020-06-29 PROCEDURE — 74011000258 HC RX REV CODE- 258: Performed by: OBSTETRICS & GYNECOLOGY

## 2020-06-29 PROCEDURE — 77030012935 HC DRSG AQUACEL BMS -B: Performed by: OBSTETRICS & GYNECOLOGY

## 2020-06-29 PROCEDURE — 76060000036 HC ANESTHESIA 2.5 TO 3 HR: Performed by: OBSTETRICS & GYNECOLOGY

## 2020-06-29 PROCEDURE — 77030008462 HC STPLR SKN PROX J&J -A: Performed by: OBSTETRICS & GYNECOLOGY

## 2020-06-29 PROCEDURE — 77030032060 HC PWDR HEMSTAT ARISTA ASRB 3GM BARD -C: Performed by: OBSTETRICS & GYNECOLOGY

## 2020-06-29 PROCEDURE — 77030018836 HC SOL IRR NACL ICUM -A: Performed by: OBSTETRICS & GYNECOLOGY

## 2020-06-29 PROCEDURE — 88307 TISSUE EXAM BY PATHOLOGIST: CPT

## 2020-06-29 PROCEDURE — 74011250636 HC RX REV CODE- 250/636: Performed by: ANESTHESIOLOGY

## 2020-06-29 PROCEDURE — 74011000250 HC RX REV CODE- 250: Performed by: PHYSICIAN ASSISTANT

## 2020-06-29 PROCEDURE — 74011000250 HC RX REV CODE- 250: Performed by: OBSTETRICS & GYNECOLOGY

## 2020-06-29 PROCEDURE — 77030011278 HC ELECTRD LIG IMPT COVD -F: Performed by: OBSTETRICS & GYNECOLOGY

## 2020-06-29 PROCEDURE — 74011250636 HC RX REV CODE- 250/636: Performed by: OBSTETRICS & GYNECOLOGY

## 2020-06-29 PROCEDURE — 77030018846 HC SOL IRR STRL H20 ICUM -A: Performed by: OBSTETRICS & GYNECOLOGY

## 2020-06-29 PROCEDURE — 0UT90ZZ RESECTION OF UTERUS, OPEN APPROACH: ICD-10-PCS | Performed by: OBSTETRICS & GYNECOLOGY

## 2020-06-29 PROCEDURE — 86900 BLOOD TYPING SEROLOGIC ABO: CPT

## 2020-06-29 PROCEDURE — 85025 COMPLETE CBC W/AUTO DIFF WBC: CPT

## 2020-06-29 PROCEDURE — 77030008684 HC TU ET CUF COVD -B: Performed by: ANESTHESIOLOGY

## 2020-06-29 PROCEDURE — 36415 COLL VENOUS BLD VENIPUNCTURE: CPT

## 2020-06-29 PROCEDURE — 74011250636 HC RX REV CODE- 250/636: Performed by: NURSE ANESTHETIST, CERTIFIED REGISTERED

## 2020-06-29 PROCEDURE — 77030031139 HC SUT VCRL2 J&J -A: Performed by: OBSTETRICS & GYNECOLOGY

## 2020-06-29 RX ORDER — NEOSTIGMINE METHYLSULFATE 1 MG/ML
INJECTION INTRAVENOUS AS NEEDED
Status: DISCONTINUED | OUTPATIENT
Start: 2020-06-29 | End: 2020-06-29 | Stop reason: HOSPADM

## 2020-06-29 RX ORDER — MIDAZOLAM HYDROCHLORIDE 1 MG/ML
INJECTION, SOLUTION INTRAMUSCULAR; INTRAVENOUS AS NEEDED
Status: DISCONTINUED | OUTPATIENT
Start: 2020-06-29 | End: 2020-06-29 | Stop reason: HOSPADM

## 2020-06-29 RX ORDER — SODIUM CHLORIDE, SODIUM LACTATE, POTASSIUM CHLORIDE, CALCIUM CHLORIDE 600; 310; 30; 20 MG/100ML; MG/100ML; MG/100ML; MG/100ML
125 INJECTION, SOLUTION INTRAVENOUS CONTINUOUS
Status: DISCONTINUED | OUTPATIENT
Start: 2020-06-29 | End: 2020-06-29 | Stop reason: HOSPADM

## 2020-06-29 RX ORDER — SODIUM CHLORIDE 0.9 % (FLUSH) 0.9 %
5-40 SYRINGE (ML) INJECTION EVERY 8 HOURS
Status: DISCONTINUED | OUTPATIENT
Start: 2020-06-29 | End: 2020-06-29 | Stop reason: HOSPADM

## 2020-06-29 RX ORDER — LIDOCAINE HYDROCHLORIDE 20 MG/ML
INJECTION, SOLUTION EPIDURAL; INFILTRATION; INTRACAUDAL; PERINEURAL AS NEEDED
Status: DISCONTINUED | OUTPATIENT
Start: 2020-06-29 | End: 2020-06-29 | Stop reason: HOSPADM

## 2020-06-29 RX ORDER — SUCCINYLCHOLINE CHLORIDE 20 MG/ML
INJECTION INTRAMUSCULAR; INTRAVENOUS AS NEEDED
Status: DISCONTINUED | OUTPATIENT
Start: 2020-06-29 | End: 2020-06-29 | Stop reason: HOSPADM

## 2020-06-29 RX ORDER — SODIUM CHLORIDE 0.9 % (FLUSH) 0.9 %
5-40 SYRINGE (ML) INJECTION EVERY 8 HOURS
Status: DISCONTINUED | OUTPATIENT
Start: 2020-06-29 | End: 2020-07-04 | Stop reason: HOSPADM

## 2020-06-29 RX ORDER — FENTANYL CITRATE 50 UG/ML
INJECTION, SOLUTION INTRAMUSCULAR; INTRAVENOUS AS NEEDED
Status: DISCONTINUED | OUTPATIENT
Start: 2020-06-29 | End: 2020-06-29 | Stop reason: HOSPADM

## 2020-06-29 RX ORDER — HEPARIN SODIUM 5000 [USP'U]/ML
5000 INJECTION, SOLUTION INTRAVENOUS; SUBCUTANEOUS ONCE
Status: COMPLETED | OUTPATIENT
Start: 2020-06-29 | End: 2020-06-29

## 2020-06-29 RX ORDER — DIPHENHYDRAMINE HYDROCHLORIDE 50 MG/ML
12.5 INJECTION, SOLUTION INTRAMUSCULAR; INTRAVENOUS AS NEEDED
Status: DISCONTINUED | OUTPATIENT
Start: 2020-06-29 | End: 2020-06-29 | Stop reason: HOSPADM

## 2020-06-29 RX ORDER — ACETAMINOPHEN 325 MG/1
650 TABLET ORAL ONCE
Status: COMPLETED | OUTPATIENT
Start: 2020-06-29 | End: 2020-06-29

## 2020-06-29 RX ORDER — DEXAMETHASONE SODIUM PHOSPHATE 4 MG/ML
INJECTION, SOLUTION INTRA-ARTICULAR; INTRALESIONAL; INTRAMUSCULAR; INTRAVENOUS; SOFT TISSUE AS NEEDED
Status: DISCONTINUED | OUTPATIENT
Start: 2020-06-29 | End: 2020-06-29 | Stop reason: HOSPADM

## 2020-06-29 RX ORDER — ONDANSETRON 2 MG/ML
4 INJECTION INTRAMUSCULAR; INTRAVENOUS AS NEEDED
Status: DISCONTINUED | OUTPATIENT
Start: 2020-06-29 | End: 2020-06-29 | Stop reason: HOSPADM

## 2020-06-29 RX ORDER — SODIUM CHLORIDE 0.9 % (FLUSH) 0.9 %
5-40 SYRINGE (ML) INJECTION AS NEEDED
Status: DISCONTINUED | OUTPATIENT
Start: 2020-06-29 | End: 2020-07-04 | Stop reason: HOSPADM

## 2020-06-29 RX ORDER — OXYCODONE HYDROCHLORIDE 5 MG/1
5 TABLET ORAL AS NEEDED
Status: DISCONTINUED | OUTPATIENT
Start: 2020-06-29 | End: 2020-06-29 | Stop reason: HOSPADM

## 2020-06-29 RX ORDER — MIDAZOLAM HYDROCHLORIDE 1 MG/ML
1 INJECTION, SOLUTION INTRAMUSCULAR; INTRAVENOUS AS NEEDED
Status: DISCONTINUED | OUTPATIENT
Start: 2020-06-29 | End: 2020-06-29 | Stop reason: HOSPADM

## 2020-06-29 RX ORDER — PROPOFOL 10 MG/ML
INJECTION, EMULSION INTRAVENOUS AS NEEDED
Status: DISCONTINUED | OUTPATIENT
Start: 2020-06-29 | End: 2020-06-29 | Stop reason: HOSPADM

## 2020-06-29 RX ORDER — MORPHINE SULFATE 10 MG/ML
2 INJECTION, SOLUTION INTRAMUSCULAR; INTRAVENOUS
Status: DISCONTINUED | OUTPATIENT
Start: 2020-06-29 | End: 2020-06-29 | Stop reason: HOSPADM

## 2020-06-29 RX ORDER — GLYCOPYRROLATE 0.2 MG/ML
INJECTION INTRAMUSCULAR; INTRAVENOUS AS NEEDED
Status: DISCONTINUED | OUTPATIENT
Start: 2020-06-29 | End: 2020-06-29 | Stop reason: HOSPADM

## 2020-06-29 RX ORDER — LIDOCAINE HYDROCHLORIDE 10 MG/ML
0.5 INJECTION, SOLUTION EPIDURAL; INFILTRATION; INTRACAUDAL; PERINEURAL AS NEEDED
Status: DISCONTINUED | OUTPATIENT
Start: 2020-06-29 | End: 2020-06-29 | Stop reason: HOSPADM

## 2020-06-29 RX ORDER — PROCHLORPERAZINE EDISYLATE 5 MG/ML
5 INJECTION INTRAMUSCULAR; INTRAVENOUS AS NEEDED
Status: DISCONTINUED | OUTPATIENT
Start: 2020-06-29 | End: 2020-06-29 | Stop reason: SDUPTHER

## 2020-06-29 RX ORDER — OXYBUTYNIN CHLORIDE 5 MG/1
10 TABLET, EXTENDED RELEASE ORAL DAILY
Status: DISCONTINUED | OUTPATIENT
Start: 2020-06-30 | End: 2020-07-04 | Stop reason: HOSPADM

## 2020-06-29 RX ORDER — HYDROMORPHONE HYDROCHLORIDE 2 MG/ML
INJECTION, SOLUTION INTRAMUSCULAR; INTRAVENOUS; SUBCUTANEOUS AS NEEDED
Status: DISCONTINUED | OUTPATIENT
Start: 2020-06-29 | End: 2020-06-29 | Stop reason: HOSPADM

## 2020-06-29 RX ORDER — FENTANYL CITRATE 50 UG/ML
25 INJECTION, SOLUTION INTRAMUSCULAR; INTRAVENOUS
Status: DISCONTINUED | OUTPATIENT
Start: 2020-06-29 | End: 2020-06-29 | Stop reason: HOSPADM

## 2020-06-29 RX ORDER — ZOLPIDEM TARTRATE 5 MG/1
5 TABLET ORAL
Status: DISCONTINUED | OUTPATIENT
Start: 2020-06-29 | End: 2020-07-04 | Stop reason: HOSPADM

## 2020-06-29 RX ORDER — SODIUM CHLORIDE 0.9 % (FLUSH) 0.9 %
5-40 SYRINGE (ML) INJECTION AS NEEDED
Status: DISCONTINUED | OUTPATIENT
Start: 2020-06-29 | End: 2020-06-29 | Stop reason: HOSPADM

## 2020-06-29 RX ORDER — LIDOCAINE 4 G/100G
2 PATCH TOPICAL EVERY 24 HOURS
Status: DISCONTINUED | OUTPATIENT
Start: 2020-06-29 | End: 2020-07-04 | Stop reason: HOSPADM

## 2020-06-29 RX ORDER — EPHEDRINE SULFATE/0.9% NACL/PF 50 MG/5 ML
SYRINGE (ML) INTRAVENOUS AS NEEDED
Status: DISCONTINUED | OUTPATIENT
Start: 2020-06-29 | End: 2020-06-29 | Stop reason: HOSPADM

## 2020-06-29 RX ORDER — MORPHINE SULFATE IN 0.9 % NACL 150MG/30ML
PATIENT CONTROLLED ANALGESIA SYRINGE INTRAVENOUS CONTINUOUS
Status: DISCONTINUED | OUTPATIENT
Start: 2020-06-29 | End: 2020-06-30

## 2020-06-29 RX ORDER — FENTANYL CITRATE 50 UG/ML
50 INJECTION, SOLUTION INTRAMUSCULAR; INTRAVENOUS AS NEEDED
Status: DISCONTINUED | OUTPATIENT
Start: 2020-06-29 | End: 2020-06-29 | Stop reason: HOSPADM

## 2020-06-29 RX ORDER — SODIUM CHLORIDE 9 MG/ML
INJECTION, SOLUTION INTRAVENOUS
Status: DISCONTINUED | OUTPATIENT
Start: 2020-06-29 | End: 2020-06-29 | Stop reason: HOSPADM

## 2020-06-29 RX ORDER — KETAMINE HYDROCHLORIDE 10 MG/ML
INJECTION, SOLUTION INTRAMUSCULAR; INTRAVENOUS AS NEEDED
Status: DISCONTINUED | OUTPATIENT
Start: 2020-06-29 | End: 2020-06-29 | Stop reason: HOSPADM

## 2020-06-29 RX ORDER — ENOXAPARIN SODIUM 100 MG/ML
40 INJECTION SUBCUTANEOUS EVERY 24 HOURS
Status: DISCONTINUED | OUTPATIENT
Start: 2020-06-30 | End: 2020-07-04 | Stop reason: HOSPADM

## 2020-06-29 RX ORDER — MORPHINE SULFATE 2 MG/ML
2 INJECTION, SOLUTION INTRAMUSCULAR; INTRAVENOUS
Status: DISCONTINUED | OUTPATIENT
Start: 2020-06-29 | End: 2020-06-30

## 2020-06-29 RX ORDER — MIDAZOLAM HYDROCHLORIDE 1 MG/ML
1 INJECTION, SOLUTION INTRAMUSCULAR; INTRAVENOUS
Status: DISCONTINUED | OUTPATIENT
Start: 2020-06-29 | End: 2020-06-29 | Stop reason: HOSPADM

## 2020-06-29 RX ORDER — NALOXONE HYDROCHLORIDE 0.4 MG/ML
0.4 INJECTION, SOLUTION INTRAMUSCULAR; INTRAVENOUS; SUBCUTANEOUS AS NEEDED
Status: DISCONTINUED | OUTPATIENT
Start: 2020-06-29 | End: 2020-07-04 | Stop reason: HOSPADM

## 2020-06-29 RX ORDER — ROCURONIUM BROMIDE 10 MG/ML
INJECTION, SOLUTION INTRAVENOUS AS NEEDED
Status: DISCONTINUED | OUTPATIENT
Start: 2020-06-29 | End: 2020-06-29 | Stop reason: HOSPADM

## 2020-06-29 RX ORDER — DOCUSATE SODIUM 100 MG/1
100 CAPSULE, LIQUID FILLED ORAL 2 TIMES DAILY
Status: DISCONTINUED | OUTPATIENT
Start: 2020-06-30 | End: 2020-07-04 | Stop reason: HOSPADM

## 2020-06-29 RX ORDER — PAROXETINE HYDROCHLORIDE 20 MG/1
20 TABLET, FILM COATED ORAL DAILY
Status: DISCONTINUED | OUTPATIENT
Start: 2020-06-30 | End: 2020-07-04 | Stop reason: HOSPADM

## 2020-06-29 RX ORDER — ONDANSETRON 2 MG/ML
INJECTION INTRAMUSCULAR; INTRAVENOUS AS NEEDED
Status: DISCONTINUED | OUTPATIENT
Start: 2020-06-29 | End: 2020-06-29 | Stop reason: HOSPADM

## 2020-06-29 RX ORDER — DEXTROSE, SODIUM CHLORIDE, SODIUM LACTATE, POTASSIUM CHLORIDE, AND CALCIUM CHLORIDE 5; .6; .31; .03; .02 G/100ML; G/100ML; G/100ML; G/100ML; G/100ML
125 INJECTION, SOLUTION INTRAVENOUS CONTINUOUS
Status: DISCONTINUED | OUTPATIENT
Start: 2020-06-29 | End: 2020-06-29 | Stop reason: HOSPADM

## 2020-06-29 RX ORDER — ACETAMINOPHEN 325 MG/1
975 TABLET ORAL EVERY 6 HOURS
Status: DISCONTINUED | OUTPATIENT
Start: 2020-06-29 | End: 2020-07-04 | Stop reason: HOSPADM

## 2020-06-29 RX ORDER — ONDANSETRON 2 MG/ML
4 INJECTION INTRAMUSCULAR; INTRAVENOUS
Status: DISCONTINUED | OUTPATIENT
Start: 2020-06-29 | End: 2020-07-04 | Stop reason: HOSPADM

## 2020-06-29 RX ORDER — SODIUM CHLORIDE, SODIUM LACTATE, POTASSIUM CHLORIDE, CALCIUM CHLORIDE 600; 310; 30; 20 MG/100ML; MG/100ML; MG/100ML; MG/100ML
100 INJECTION, SOLUTION INTRAVENOUS CONTINUOUS
Status: DISCONTINUED | OUTPATIENT
Start: 2020-06-29 | End: 2020-07-01

## 2020-06-29 RX ORDER — LEVOTHYROXINE SODIUM 75 UG/1
75 TABLET ORAL
Status: DISCONTINUED | OUTPATIENT
Start: 2020-06-30 | End: 2020-07-04 | Stop reason: HOSPADM

## 2020-06-29 RX ADMIN — LIDOCAINE HYDROCHLORIDE 80 MG: 20 INJECTION, SOLUTION EPIDURAL; INFILTRATION; INTRACAUDAL; PERINEURAL at 11:11

## 2020-06-29 RX ADMIN — PROPOFOL 30 MG: 10 INJECTION, EMULSION INTRAVENOUS at 13:18

## 2020-06-29 RX ADMIN — FENTANYL CITRATE 50 MCG: 50 INJECTION, SOLUTION INTRAMUSCULAR; INTRAVENOUS at 11:11

## 2020-06-29 RX ADMIN — ONDANSETRON HYDROCHLORIDE 4 MG: 2 INJECTION, SOLUTION INTRAMUSCULAR; INTRAVENOUS at 11:17

## 2020-06-29 RX ADMIN — MIDAZOLAM 1 MG: 1 INJECTION INTRAMUSCULAR; INTRAVENOUS at 11:02

## 2020-06-29 RX ADMIN — Medication 10 ML: at 15:35

## 2020-06-29 RX ADMIN — MORPHINE SULFATE: 50 INJECTION, SOLUTION, CONCENTRATE INTRAVENOUS at 14:32

## 2020-06-29 RX ADMIN — ROCURONIUM BROMIDE 30 MG: 10 SOLUTION INTRAVENOUS at 11:22

## 2020-06-29 RX ADMIN — CEFOTETAN DISODIUM 2 G: 2 INJECTION, POWDER, FOR SOLUTION INTRAMUSCULAR; INTRAVENOUS at 11:23

## 2020-06-29 RX ADMIN — PROPOFOL 30 MG: 10 INJECTION, EMULSION INTRAVENOUS at 13:21

## 2020-06-29 RX ADMIN — SODIUM CHLORIDE, SODIUM LACTATE, POTASSIUM CHLORIDE, AND CALCIUM CHLORIDE 100 ML/HR: 600; 310; 30; 20 INJECTION, SOLUTION INTRAVENOUS at 15:32

## 2020-06-29 RX ADMIN — PROPOFOL 130 MG: 10 INJECTION, EMULSION INTRAVENOUS at 11:11

## 2020-06-29 RX ADMIN — CEFAZOLIN SODIUM 1 G: 1 INJECTION, POWDER, FOR SOLUTION INTRAMUSCULAR; INTRAVENOUS at 22:10

## 2020-06-29 RX ADMIN — SODIUM CHLORIDE, POTASSIUM CHLORIDE, SODIUM LACTATE AND CALCIUM CHLORIDE: 600; 310; 30; 20 INJECTION, SOLUTION INTRAVENOUS at 10:30

## 2020-06-29 RX ADMIN — ROCURONIUM BROMIDE 10 MG: 10 SOLUTION INTRAVENOUS at 11:11

## 2020-06-29 RX ADMIN — SUCCINYLCHOLINE CHLORIDE 140 MG: 20 INJECTION, SOLUTION INTRAMUSCULAR; INTRAVENOUS at 11:12

## 2020-06-29 RX ADMIN — SODIUM CHLORIDE: 900 INJECTION, SOLUTION INTRAVENOUS at 12:50

## 2020-06-29 RX ADMIN — HYDROMORPHONE HYDROCHLORIDE 1 MG: 2 INJECTION, SOLUTION INTRAMUSCULAR; INTRAVENOUS; SUBCUTANEOUS at 11:31

## 2020-06-29 RX ADMIN — ACETAMINOPHEN 650 MG: 325 TABLET ORAL at 09:08

## 2020-06-29 RX ADMIN — KETAMINE HYDROCHLORIDE 20 MG: 10 INJECTION, SOLUTION INTRAMUSCULAR; INTRAVENOUS at 11:17

## 2020-06-29 RX ADMIN — Medication 10 MG: at 11:54

## 2020-06-29 RX ADMIN — FENTANYL CITRATE 50 MCG: 50 INJECTION, SOLUTION INTRAMUSCULAR; INTRAVENOUS at 11:48

## 2020-06-29 RX ADMIN — NEOSTIGMINE METHYLSULFATE 3 MG: 1 INJECTION, SOLUTION INTRAVENOUS at 13:18

## 2020-06-29 RX ADMIN — ACETAMINOPHEN 975 MG: 325 TABLET ORAL at 19:01

## 2020-06-29 RX ADMIN — GLYCOPYRROLATE 0.4 MG: 0.2 INJECTION, SOLUTION INTRAMUSCULAR; INTRAVENOUS at 13:18

## 2020-06-29 RX ADMIN — Medication 10 ML: at 15:33

## 2020-06-29 RX ADMIN — HEPARIN SODIUM 5000 UNITS: 5000 INJECTION, SOLUTION INTRAVENOUS; SUBCUTANEOUS at 09:13

## 2020-06-29 RX ADMIN — Medication 10 ML: at 15:34

## 2020-06-29 RX ADMIN — DEXAMETHASONE SODIUM PHOSPHATE 4 MG: 4 INJECTION, SOLUTION INTRAMUSCULAR; INTRAVENOUS at 11:17

## 2020-06-29 NOTE — ANESTHESIA PREPROCEDURE EVALUATION
Relevant Problems   No relevant active problems       Anesthetic History               Review of Systems / Medical History      Pulmonary                   Neuro/Psych              Cardiovascular    Hypertension                   GI/Hepatic/Renal     GERD           Endo/Other      Hypothyroidism       Other Findings              Physical Exam    Airway  Mallampati: II  TM Distance: > 6 cm  Neck ROM: normal range of motion   Mouth opening: Normal     Cardiovascular  Regular rate and rhythm,  S1 and S2 normal,  no murmur, click, rub, or gallop             Dental  No notable dental hx       Pulmonary  Breath sounds clear to auscultation               Abdominal  GI exam deferred       Other Findings            Anesthetic Plan    ASA: 2  Anesthesia type: general          Induction: Intravenous  Anesthetic plan and risks discussed with: Patient

## 2020-06-29 NOTE — BRIEF OP NOTE
Brief Postoperative Note    Patient: Marie Arriola  YOB: 1945  MRN: [id number]    Date of Procedure: 6/29/2020     Pre-Op Diagnosis: PELVIC MASS    Post-Op Diagnosis: 1) Mucinous cystadenoma; 2) Endometrial polyps      Procedure(s):  EXPLORATORY LAPAROTOMY, RESECTION OF MASS, TOTAL ABDOMINAL HYSTERECTOMY, BILATERAL SALPINGINGO OOPHORECTOMY,  OMENTECTOMY, URETEROLYSIS     Surgeon(s): Kacey Boykin MD    Surgical Assistant: Physician Assistant: Anel Lane PA-C    Anesthesia: General     Estimated Blood Loss (mL): less than 303     Complications: None    Specimens:   ID Type Source Tests Collected by Time Destination   1 : PELVIC MASS, LEFT TUBE AND OVARY Frozen Section Mass  Kacye Boykin MD 6/29/2020 1214 Pathology   2 : UTERUS, RIGHT TUBE AND OVARY Fresh Uterus  Kacey Boykin MD 6/29/2020 1242 Pathology   3 : RIGHT PARACOLIC GUTTER Fresh   Kacey Boykin MD 6/29/2020 1258 Pathology   4 : LEFT PARACOLIC GUTTER Fresh   Kacey Boykin MD 6/29/2020 1301 Pathology   5 : OMENTUM Fresh Omentum  Kacey Boykin MD 6/29/2020 1302 Pathology   1 : PELVIC WASHINGS Fresh Pelvis  Kacey Boyikn MD 6/29/2020 1144 Cytology        Implants: * No implants in log *    Drains: * No LDAs found *    Findings: On exploratory laparotomy there was a 30cm mass arising from the left ovary. The mass had increased vascularity coming from the uterine vessels. This area was very fibrotic requiring urterolysis of the left ureter, as the ureter was abutting against the mass. The uterus was normal size, and the right tube and ovary were normal appearing. Prior ileo-cecal resection visualized. Normal appearing omentum, peritoneum, liver surface, small bowel, and colon. 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 As noted previously, the appendix was surgically absent. Frozen pathology of the endometrium demonstrated benign endometrial polyps.      Electronically Signed by Roselyn Vera MD on 6/29/2020 at 1:36 PM

## 2020-06-29 NOTE — PERIOP NOTES
Sapphire AH Absorbable Hemostatic Particles used during the procedure  Ref # [id number], Lot # [id number], Exp.  Date 11/28/2024

## 2020-06-29 NOTE — ANESTHESIA POSTPROCEDURE EVALUATION
Procedure(s):  EXPLORATORY LAPAROTOMY, RESECTION OF MASS, TOTAL ABDOMINAL HYSTERECTOMY, BILATERAL SALPINGINGO OOPHORECTOMY,  OMENTECTOMY, URETEROLYSIS. general    Anesthesia Post Evaluation        Patient location during evaluation: PACU  Patient participation: complete - patient participated  Level of consciousness: awake and alert  Pain management: adequate  Airway patency: patent  Anesthetic complications: no  Cardiovascular status: acceptable  Respiratory status: acceptable  Hydration status: acceptable  Comments: I have seen and evaluated the patient and is ready for discharge. Kelechi Guzman MD    Post anesthesia nausea and vomiting:  none      INITIAL Post-op Vital signs:   Vitals Value Taken Time   /70 6/29/2020  1:50 PM   Temp 36.5 °C (97.7 °F) 6/29/2020  1:40 PM   Pulse 98 6/29/2020  1:55 PM   Resp 17 6/29/2020  1:55 PM   SpO2 100 % 6/29/2020  1:55 PM   Vitals shown include unvalidated device data.

## 2020-06-29 NOTE — PERIOP NOTES
TRANSFER - OUT REPORT:    Verbal report given to Graciela Arreola (name) on Aylin Mann  being transferred to [facility] (unit) for routine post - op       Report consisted of patients Situation, Background, Assessment and   Recommendations(SBAR). Time Pre op antibiotic given:1123  Anesthesia Stop time: 3835   Bo Present on Transfer to floor:YES  Order for Bo on Chart:YES  Discharge Prescriptions with Chart:NO    Information from the following report(s) SBAR, Kardex, OR Summary, Intake/Output, MAR, Med Rec Status and Cardiac Rhythm SR was reviewed with the receiving nurse. Opportunity for questions and clarification was provided. Is the patient on 02? YES       L/Min 2L      Is the patient on a monitor? NO    Is the nurse transporting with the patient? NO    Surgical Waiting Area notified of patient's transfer from PACU?  YES      The following personal items collected during your admission accompanied patient upon transfer:   Dental Appliance: Dental Appliances: None  Vision:    Hearing Aid:    Jewelry:    Clothing:    Other Valuables:    Valuables sent to safe:

## 2020-06-29 NOTE — INTERVAL H&P NOTE
Date of Surgery Update: 
Isaak Molina was seen and examined. History and physical has been reviewed. The patient has been examined. There have been no significant clinical changes since the completion of the originally dated History and Physical. Plan to proceed with exploratory laparotomy, resection of mass, STEPHANY/BSO, possible debulking and/or staging based on frozen pathology.   
 
Signed By: Tanna Arora MD   
 June 29, 2020 10:30 AM

## 2020-06-30 LAB
ANION GAP SERPL CALC-SCNC: 6 MMOL/L (ref 5–15)
BUN SERPL-MCNC: 11 MG/DL (ref 6–20)
BUN/CREAT SERPL: 15 (ref 12–20)
CALCIUM SERPL-MCNC: 8 MG/DL (ref 8.5–10.1)
CHLORIDE SERPL-SCNC: 111 MMOL/L (ref 97–108)
CO2 SERPL-SCNC: 23 MMOL/L (ref 21–32)
CREAT SERPL-MCNC: 0.71 MG/DL (ref 0.55–1.02)
ERYTHROCYTE [DISTWIDTH] IN BLOOD BY AUTOMATED COUNT: 13.2 % (ref 11.5–14.5)
GLUCOSE SERPL-MCNC: 138 MG/DL (ref 65–100)
HCT VFR BLD AUTO: 40.7 % (ref 35–47)
HGB BLD-MCNC: 13.3 G/DL (ref 11.5–16)
MCH RBC QN AUTO: 30.9 PG (ref 26–34)
MCHC RBC AUTO-ENTMCNC: 32.7 G/DL (ref 30–36.5)
MCV RBC AUTO: 94.7 FL (ref 80–99)
NRBC # BLD: 0 K/UL (ref 0–0.01)
NRBC BLD-RTO: 0 PER 100 WBC
PLATELET # BLD AUTO: 160 K/UL (ref 150–400)
PMV BLD AUTO: 11.1 FL (ref 8.9–12.9)
POTASSIUM SERPL-SCNC: 4.5 MMOL/L (ref 3.5–5.1)
RBC # BLD AUTO: 4.3 M/UL (ref 3.8–5.2)
SODIUM SERPL-SCNC: 140 MMOL/L (ref 136–145)
WBC # BLD AUTO: 8.9 K/UL (ref 3.6–11)

## 2020-06-30 PROCEDURE — 85027 COMPLETE CBC AUTOMATED: CPT

## 2020-06-30 PROCEDURE — 65410000002 HC RM PRIVATE OB

## 2020-06-30 PROCEDURE — 74011000250 HC RX REV CODE- 250: Performed by: PHYSICIAN ASSISTANT

## 2020-06-30 PROCEDURE — 36415 COLL VENOUS BLD VENIPUNCTURE: CPT

## 2020-06-30 PROCEDURE — 74011250637 HC RX REV CODE- 250/637: Performed by: PHYSICIAN ASSISTANT

## 2020-06-30 PROCEDURE — 97161 PT EVAL LOW COMPLEX 20 MIN: CPT

## 2020-06-30 PROCEDURE — 97530 THERAPEUTIC ACTIVITIES: CPT

## 2020-06-30 PROCEDURE — 74011250636 HC RX REV CODE- 250/636: Performed by: PHYSICIAN ASSISTANT

## 2020-06-30 PROCEDURE — 80048 BASIC METABOLIC PNL TOTAL CA: CPT

## 2020-06-30 RX ORDER — TRAMADOL HYDROCHLORIDE 50 MG/1
50-100 TABLET ORAL
Qty: 20 TAB | Refills: 0 | Status: SHIPPED | OUTPATIENT
Start: 2020-06-30 | End: 2020-07-03

## 2020-06-30 RX ORDER — IBUPROFEN 200 MG
600 TABLET ORAL
Qty: 50 TAB | Refills: 0 | Status: SHIPPED | OUTPATIENT
Start: 2020-06-30

## 2020-06-30 RX ORDER — MORPHINE SULFATE 2 MG/ML
1 INJECTION, SOLUTION INTRAMUSCULAR; INTRAVENOUS
Status: DISCONTINUED | OUTPATIENT
Start: 2020-06-30 | End: 2020-07-04 | Stop reason: HOSPADM

## 2020-06-30 RX ORDER — ACETAMINOPHEN 325 MG/1
650 TABLET ORAL
Qty: 60 TAB | Refills: 0 | Status: SHIPPED | OUTPATIENT
Start: 2020-06-30 | End: 2020-07-04

## 2020-06-30 RX ORDER — DOCUSATE SODIUM 100 MG/1
100 CAPSULE, LIQUID FILLED ORAL 2 TIMES DAILY
Qty: 40 CAP | Refills: 1 | Status: SHIPPED | OUTPATIENT
Start: 2020-06-30

## 2020-06-30 RX ORDER — KETOROLAC TROMETHAMINE 30 MG/ML
15 INJECTION, SOLUTION INTRAMUSCULAR; INTRAVENOUS
Status: DISCONTINUED | OUTPATIENT
Start: 2020-06-30 | End: 2020-07-04 | Stop reason: HOSPADM

## 2020-06-30 RX ORDER — OXYCODONE HYDROCHLORIDE 5 MG/1
5 TABLET ORAL
Status: DISCONTINUED | OUTPATIENT
Start: 2020-06-30 | End: 2020-07-04 | Stop reason: HOSPADM

## 2020-06-30 RX ADMIN — LEVOTHYROXINE SODIUM 75 MCG: 0.07 TABLET ORAL at 07:25

## 2020-06-30 RX ADMIN — ACETAMINOPHEN 975 MG: 325 TABLET ORAL at 07:25

## 2020-06-30 RX ADMIN — ENOXAPARIN SODIUM 40 MG: 40 INJECTION SUBCUTANEOUS at 09:01

## 2020-06-30 RX ADMIN — SODIUM CHLORIDE, SODIUM LACTATE, POTASSIUM CHLORIDE, AND CALCIUM CHLORIDE 100 ML/HR: 600; 310; 30; 20 INJECTION, SOLUTION INTRAVENOUS at 20:09

## 2020-06-30 RX ADMIN — ACETAMINOPHEN 975 MG: 325 TABLET ORAL at 01:24

## 2020-06-30 RX ADMIN — PAROXETINE HYDROCHLORIDE 20 MG: 20 TABLET, FILM COATED ORAL at 09:01

## 2020-06-30 RX ADMIN — OXYBUTYNIN CHLORIDE 10 MG: 5 TABLET, EXTENDED RELEASE ORAL at 09:01

## 2020-06-30 RX ADMIN — ACETAMINOPHEN 975 MG: 325 TABLET ORAL at 21:17

## 2020-06-30 RX ADMIN — DOCUSATE SODIUM 100 MG: 100 CAPSULE, LIQUID FILLED ORAL at 17:35

## 2020-06-30 RX ADMIN — ACETAMINOPHEN 975 MG: 325 TABLET ORAL at 15:14

## 2020-06-30 RX ADMIN — SODIUM CHLORIDE, SODIUM LACTATE, POTASSIUM CHLORIDE, AND CALCIUM CHLORIDE 100 ML/HR: 600; 310; 30; 20 INJECTION, SOLUTION INTRAVENOUS at 01:04

## 2020-06-30 RX ADMIN — DOCUSATE SODIUM 100 MG: 100 CAPSULE, LIQUID FILLED ORAL at 09:01

## 2020-06-30 NOTE — PROGRESS NOTES
Gynecologic Oncology - [address], [address], [address]  P [phone]  F [phone]       Patient: Theo Muniz  Admit Date: 6/29/2020  Admit Dx: Pelvic mass [R19.00]  Pelvic mass in female [R19.00]    Subjective:     No events, feels well. No N/V. Not ambulated yet.     Objective:     Date 06/29/20 0700 - 06/30/20 0659 06/30/20 0700 - 07/01/20 0659   Shift 7000-6738 2717-4084 24 Hour Total 5610-1467 3860-2475 24 Hour Total   INTAKE   I.V.(mL/kg/hr) 1315(1.5)  1315(0.8)        Volume (0.9% sodium chloride infusion) 300  300        Volume (lactated Ringers infusion) 15  15        Volume (lactated Ringers infusion) 1000  1000      Shift Total(mL/kg) 1315(18.3)  1315(18.3)      OUTPUT   Urine(mL/kg/hr) 250(0.3) 1650(1.9) 1900(1.1)        Urine Output 200  200        Urine Output (mL) ([REMOVED] Urinary Catheter 06/29/20) 50 1650 1700      Blood 25  25        Estimated Blood Loss 25  25      Shift Total(mL/kg) 275(3.8) 1650(23) 1925(26.9)      NET 1040 1650 -610      Weight (kg) 71.7 71.7 71.7 71.7 71.7 71.7       Physical Exam  /74 (BP 1 Location: Left arm, BP Patient Position: At rest;Head of bed elevated (Comment degrees))   Pulse 95   Temp 99.1 °F (37.3 °C)   Resp 18   Wt 158 lb (71.7 kg)   SpO2 96%   BMI 25.50 kg/m²      General:  alert, cooperative, no distress     Cardiac:  Regular rate and rhythm        Lungs:  nonlabored  Abdomen:  soft, distended/tympani, normal bowel sounds, tenderness mild - periwound, without guarding, without rebound       Wound:  clean, dry, dressed   Extremity: no edema    Wt Readings from Last 3 Encounters:   06/29/20 158 lb (71.7 kg)   04/23/20 152 lb 12.8 oz (69.3 kg)         Data Review      Recent Labs     06/30/20  0630 06/29/20  0906   WBC 8.9 4.8   ANEU  --  2.7   HGB 13.3 14.1   HCT 40.7 44.3    112*     Recent Labs     06/30/20 0630 06/29/20  0906    143   K 4.5 4.1   * 111*   * 85   BUN 11 23*   CREA 0.71 0.68   CA 8.0* 8.9   ALB  --  3.8   TBILI  --  0.5   ALT  --  18         Assessment/Plan:     Admitted for Pelvic mass [R19.00]  Pelvic mass in female [R19.00]    Active Hospital Problems    Diagnosis Date Noted    Pelvic mass in female 06/29/2020    Pelvic mass 04/24/2020       Wisam Barrera 1 Day Post-Op Procedure(s):  EXPLORATORY LAPAROTOMY, RESECTION OF MASS, TOTAL ABDOMINAL HYSTERECTOMY, BILATERAL SALPINGINGO OOPHORECTOMY,  OMENTECTOMY, URETEROLYSIS for PELVIC MASS    Onc: mucinous cystadenoma on frozen. Await final path  Heme/CV: Hemodynamically stable  Renal: indices baseline  FEN/GI: diet as tolerated  ID/Wound: afeb, abd distended. Progress slowly  Neuro: pain well controlled. PPX: ambulate, SCDs, lovenox  Disposition: Doing well postoperatively. Continue routine postop care. Roshni Brewer PA-C      Attending Attestation    I have seen and examined the above patient and agree with the care provider's assessment and plan. Doing well overall. Pain somewhat uncontrolled, although she is not using her PCA. Will work with patient to have her better understand the PCA. ADAT. Continue routine postop care. Encourage ambulation and IS.      Traci Velarde MD

## 2020-06-30 NOTE — ROUTINE PROCESS
Bedside and Verbal shift change report given to Alecia Marinelli RN (oncoming nurse) by Angelina Bishop RN (offgoing nurse). Report included the following information SBAR, Kardex, Intake/Output, MAR and Recent Results.

## 2020-06-30 NOTE — PROGRESS NOTES
Bedside and Verbal shift change report given to JUSTICE Torres (oncoming nurse) by Juvenal Wei (offgoing nurse). Report included the following information Kardex, Procedure Summary, Intake/Output, MAR, Accordion and Recent Results.

## 2020-06-30 NOTE — OP NOTES
Gynecologic Oncology Operative Report    Arabella Campos  6/29/2020    Pre-operative dx:  1) 25-cm pelvic mass; 2) Elevated Ca 19-9; 3) Post-menopausal bleeding    Post-operative dx:  1) Mucinous cystadenoma of the left ovary; 2) Endometrial polyp     Procedure:    Exploratory laparotomy, resection of bleeding, ureterolysis of the left ureter, total abdominal hysterectomy with bilateral salpingo-oophorectomy, omentectomy     Surgeon:  Ольга Lambert MD     Assistant:  Amy River surgical assistance was required throughout the case due to the complexity of the procedure. He assisted with dissection, development of the retroperitoneal spaces, and identification of pertinent anatomy during the procedure. Anesthesia:  GETA     EBL:  <50 cc     Antibiotics: Cefotetan 2 grams IV    VTE Prophylaxis: SCDs     Complications:  None    Implants: None     Specimens:  Uterus, cervix, pelvic mass, bilateral tubes and ovaries, peritoneal biopsies, pelvic washings, omentum     Operative indications:  76 y.o. female with 25cm pelvic mass and elevated Ca 19-9    Operative findings: On exploratory laparotomy there was a 30cm mass arising from the left ovary. The mass had increased vascularity coming from the uterine vessels. This area was very fibrotic requiring urterolysis of the left ureter, as the ureter was abutting against the mass. The uterus was normal size, and the right tube and ovary were normal appearing. Prior ileo-cecal resection visualized. Normal appearing omentum, peritoneum, liver surface, small bowel, and colon. 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 As noted previously, the appendix was surgically absent. Frozen pathology of the endometrium demonstrated benign endometrial polyps. Operative report: After informed consent was obtained, the patient was taken to the operating room where general endotracheal anesthesia was performed without difficulty.  The patient was positioned in the dorsal lithotomy position in [address] [place] [address] and the usual precautions taken with her arms tucked bilaterally. She was prepped and draped in normal sterile fashion. Time-out was performed and a Bo catheter was placed into the bladder. A vertical midline incision was made and carried down to the underlying fascia. The fascia was incised in the midline, and the peritoneum was then entered. After the incision was extended the operative findings noted above were identified. The large mass was delivered through the midline incision and exteriorized. There was considerable adherence to the left pelvic side wall and retroperitoneum. The left pelvic side-wall was opened and developed. The left ureter was identified, and ran directly along the mass within a conglomeration of fibrosis and vessels. The mass seemed to have neovascularity from the uterus. The left IP ligament was then skeletonized and then clamped, divided and suture ligated with 0-Vicryl. The left ureter was then dissected out down to the level of the cervix. The left ureter was dissected out and skeletonized and placed on a vessel loop to be able to dissect the mass away form the ureter. The neovascularity and the left utero-ovarian ligament were then sealed and divided with the LigaSure device. The mass was then handed off the operative field and sent for frozen pathology. The bowel was then freed up and placed up into the upper abdomen, packed away and a Bookwalter abdominal retractor was then placed. Round ligaments were then divided on each side with electrocautery and the retroperitoneal space was opened bilaterally. The ureters were once again identified and preserved. The right infundibulopelvic ligament was then skeletonized, sealed and divided with the LigaSure device. The bladder flap was created with electrocautery and the bladder was dissected down off the lower uterine segment and cervix.  The uterine arteries were then skeletonized bilaterally, clamped with Zeppelin clamps, divided, and then suture ligated. Additional pedicles along the cervix were taken similarly until to the level of the cervical-vaginal junction. The vaginal angles were then clamped with right-angle Zeppelin clamps, divided, suture ligated with 0-Vicryl, and tagged. The specimen was then amputated from the vagina using Blake scissors and the vaginal walls were grasped with Justyna clamps. The vaginal cuff was then closed with 0 Vicryl suture in a figure-of-eight fashion with careful attention to include the vaginal cuff angles and the vaginal mucosa within the closure and avoid the bladder. Frozen pathology at this time was consistent with a benign mucinous cystadenoma of the left ovary and endometrial polyps. I was still concerned that there may be a borderline tumor, so additional peritoneal biopsies were taken using the Bovie electrocautery along the left and right para-colic gutters. Additional, the distal half of the omentum was sealed and divided with the LigaSure device and sent for permanent pathology. After the abdomen and pelvis were irrigated and all pedicles felt to be hemostatic the laparotomy packs were removed and the fascia was then reapproximated in a running mass abdominal wall closure using 1 looped PDS. The subcutaneous space was then irrigated, and the skin was closed with staples. An Aquacel dressing was placed in the operating room. The patient tolerated the procedure well. Sponge, lap, needle and instrument counts were correct x2 at the end of the procedure.      Jaret Christy MD  6/29/2020

## 2020-06-30 NOTE — PROGRESS NOTES
Problem: Mobility Impaired (Adult and Pediatric)  Goal: *Acute Goals and Plan of Care (Insert Text)  Description: FUNCTIONAL STATUS PRIOR TO ADMISSION: Patient was independent and active without use of DME.    HOME SUPPORT PRIOR TO ADMISSION: The patient lived alone has family nearby. Physical Therapy Goals  Initiated 6/30/2020  1. Patient will move from supine to sit and sit to supine , scoot up and down and roll side to side in bed with independence within 7 day(s). 2.  Patient will transfer from bed to chair and chair to bed with independence using the least restrictive device within 7 day(s). 3.  Patient will perform sit to stand with independence within 7 day(s). 4.  Patient will ambulate with modified independence for 300 feet with the least restrictive device within 7 day(s). 5.  Patient will ascend/descend 2 stairs with one handrail(s) with modified independence within 7 day(s). Outcome: Progressing Towards Goal    PHYSICAL THERAPY EVALUATION  Patient: Phuong Medina (64 y.o. female)  Date: 6/30/2020  Primary Diagnosis: Pelvic mass [R19.00]  Pelvic mass in female [R19.00]  Procedure(s) (LRB):  EXPLORATORY LAPAROTOMY, RESECTION OF MASS, TOTAL ABDOMINAL HYSTERECTOMY, BILATERAL SALPINGINGO OOPHORECTOMY,  OMENTECTOMY, URETEROLYSIS (N/A) 1 Day Post-Op   Precautions:   Fall      ASSESSMENT  Based on the objective data described below, the patient presents with [place], slightly impaired standing balance, VSS, see below (was on 2 liters of 02 and uses no supplemental 02 at baseline). In addition she was received the bed saturated in urine. Changed out her disposable underwear and pad and dicussed with nursing: per pt she is incontinent of urine at baseline (wears an adult diaper as well as a pad which she has to change out throughout the day). She would highly benefit from a purewick while she is here.  She was able to step over to the chair and remains up to the chair post session, first time to the chair, POD 1 from an exploratory lap, resection of mass, STEPHANY, bilateral salpingingo oophorectomy, omentectomy, and ureterolysis. All things considered, she did well today and anticipate steady gains allowing for discharge to home. Current Level of Function Impacting Discharge (mobility/balance): impaired standing balance, min assist at most    Functional Outcome Measure: The patient scored 0 on the TUG (unable to complete) outcome measure which is indicative of increased fall risk. .      Other factors to consider for discharge: will need either assessment/documentation for home 02 needs if not weaned, but did well today ion 2 liters, anticipate weaning should go well. Vitals:     06/30/20 1137 06/30/20 1145 06/30/20 1153   BP:  132/71 147/69 147/73   BP 1 Location:  Left arm Left arm Left arm   BP Patient Position:  Supine; At rest Sitting;Pre-activity Sitting;Post activity   Pulse:  79 86 79   Resp:       Temp:       SpO2: on 2 liters of 02  97% 97% 98%                  Patient will benefit from skilled therapy intervention to address the above noted impairments. PLAN :  Recommendations and Planned Interventions: bed mobility training, transfer training, gait training, therapeutic exercises, patient and family training/education, and therapeutic activities      Frequency/Duration: Patient will be followed by physical therapy:  5 times a week to address goals. Recommendation for discharge: (in order for the patient to meet his/her long term goals)  Physical therapy at least 2 days/week in the home     This discharge recommendation:  A follow-up discussion with the attending provider and/or case management is planned    IF patient discharges home will need the following DME: to be determined (TBD), likely none         SUBJECTIVE:   Patient stated I've had every kind of hearing aide and none of them have worked for me.     OBJECTIVE DATA SUMMARY:   Consult received, chart reviewed, pt cleared by nursing  HISTORY:    Past Medical History:   Diagnosis Date    Depression     GERD (gastroesophageal reflux disease)     High cholesterol     Hypercholesteremia     Hypertension     Hypothyroidism      Past Surgical History:   Procedure Laterality Date    HX OTHER SURGICAL  2009    Tumor removed from colon, benign    HX TUBAL LIGATION         Personal factors and/or comorbidities impacting plan of care: lives alone    Home Situation  Home Environment: Private residence  # Steps to Enter: 2  Rails to Enter: Yes  Hand Rails : Left  One/Two Story Residence: One story  Living Alone: Yes  Support Systems: Family member(s)  Patient Expects to be Discharged to[de-identified] Private residence  Current DME Used/Available at Home: None    EXAMINATION/PRESENTATION/DECISION MAKING:   Critical Behavior:  Neurologic State: Alert  Orientation Level: Oriented X4        Hearing: Auditory  Auditory Impairment: Hard of hearing, bilateral  Skin:  refer to MD and nursing notes, surgical bandage in place  Edema: none noted  Range Of Motion:  AROM: Within functional limits                       Strength:    Strength: Within functional limits                    Tone & Sensation:                  Sensation: Impaired(at baseline in hands)               Coordination:     Vision:      Functional Mobility:  Bed Mobility:     Supine to Sit: Stand-by assistance        Transfers:  Sit to Stand: Stand-by assistance  Stand to Sit: Stand-by assistance        Bed to Chair: Minimum assistance              Balance:   Sitting: Intact; Without support  Standing: Impaired  Standing - Static: Constant support;Good  Standing - Dynamic : Constant support; Fair  Ambulation/Gait Training:                                                         Stairs:               Therapeutic Exercises:       Functional Measure:  Timed up and go:    Timed Get Up And Go Test: 0(unable without assist)       < than 10 seconds=Normal  Greater then 13.5 seconds (in elderly)=Increased fall risk Kuamr AMEZQUITA. Predicting the probability for falls in community dwelling older adults using the Timed Up and Go Test. Phys Ther. 2000;80:896-903. Physical Therapy Evaluation Charge Determination   History Examination Presentation Decision-Making   MEDIUM  Complexity : 1-2 comorbidities / personal factors will impact the outcome/ POC  LOW Complexity : 1-2 Standardized tests and measures addressing body structure, function, activity limitation and / or participation in recreation  LOW Complexity : Stable, uncomplicated  LOW Complexity : FOTO score of       Based on the above components, the patient evaluation is determined to be of the following complexity level: LOW     Pain Rating:  None rated reports feeling sore when moving    Activity Tolerance:   Good  Please refer to the flowsheet for vital signs taken during this treatment. After treatment patient left in no apparent distress:   Sitting in chair and Call bell within reach    COMMUNICATION/EDUCATION:   The patients plan of care was discussed with: Registered nurse. Fall prevention education was provided and the patient/caregiver indicated understanding., Patient/family have participated as able in goal setting and plan of care. , and Patient/family agree to work toward stated goals and plan of care.     Thank you for this referral.  Bird Ace   Time Calculation: 33 mins

## 2020-07-01 PROCEDURE — 65410000002 HC RM PRIVATE OB

## 2020-07-01 PROCEDURE — 74011000250 HC RX REV CODE- 250: Performed by: PHYSICIAN ASSISTANT

## 2020-07-01 PROCEDURE — 97116 GAIT TRAINING THERAPY: CPT

## 2020-07-01 PROCEDURE — 74011250636 HC RX REV CODE- 250/636: Performed by: PHYSICIAN ASSISTANT

## 2020-07-01 PROCEDURE — 74011250637 HC RX REV CODE- 250/637: Performed by: PHYSICIAN ASSISTANT

## 2020-07-01 RX ORDER — SODIUM CHLORIDE, SODIUM LACTATE, POTASSIUM CHLORIDE, CALCIUM CHLORIDE 600; 310; 30; 20 MG/100ML; MG/100ML; MG/100ML; MG/100ML
125 INJECTION, SOLUTION INTRAVENOUS CONTINUOUS
Status: DISCONTINUED | OUTPATIENT
Start: 2020-07-01 | End: 2020-07-04 | Stop reason: HOSPADM

## 2020-07-01 RX ADMIN — SODIUM CHLORIDE, SODIUM LACTATE, POTASSIUM CHLORIDE, AND CALCIUM CHLORIDE 100 ML/HR: 600; 310; 30; 20 INJECTION, SOLUTION INTRAVENOUS at 06:15

## 2020-07-01 RX ADMIN — ENOXAPARIN SODIUM 40 MG: 40 INJECTION SUBCUTANEOUS at 10:48

## 2020-07-01 RX ADMIN — DOCUSATE SODIUM 100 MG: 100 CAPSULE, LIQUID FILLED ORAL at 10:48

## 2020-07-01 RX ADMIN — LEVOTHYROXINE SODIUM 75 MCG: 0.07 TABLET ORAL at 07:30

## 2020-07-01 RX ADMIN — PAROXETINE HYDROCHLORIDE 20 MG: 20 TABLET, FILM COATED ORAL at 10:48

## 2020-07-01 RX ADMIN — OXYBUTYNIN CHLORIDE 10 MG: 5 TABLET, EXTENDED RELEASE ORAL at 10:48

## 2020-07-01 RX ADMIN — SODIUM CHLORIDE, SODIUM LACTATE, POTASSIUM CHLORIDE, AND CALCIUM CHLORIDE 75 ML/HR: 600; 310; 30; 20 INJECTION, SOLUTION INTRAVENOUS at 22:08

## 2020-07-01 RX ADMIN — DOCUSATE SODIUM 100 MG: 100 CAPSULE, LIQUID FILLED ORAL at 19:20

## 2020-07-01 RX ADMIN — ACETAMINOPHEN 975 MG: 325 TABLET ORAL at 06:28

## 2020-07-01 RX ADMIN — ACETAMINOPHEN 975 MG: 325 TABLET ORAL at 19:20

## 2020-07-01 NOTE — PROGRESS NOTES
Gynecologic Oncology - [address], [address], [address] BAKER [phone]  F [phone]       Patient: Arabella Campos  Admit Date: 6/29/2020  Admit Dx: Pelvic mass [R19.00]  Pelvic mass in female [R19.00]    Subjective:     No events, feels well. Pain controlled. No flatus. +belching. No N/V. Up to chair yesterday. Not ambulated yet. Objective:     Date 06/30/20 0700 - 07/01/20 0659 07/01/20 0700 - 07/02/20 0659   Shift 6832-0051 2542-2115 24 Hour Total 7545-8933 7634-3242 24 Hour Total   INTAKE   P.O. 860  860        P. O. 860  860      Shift Total(mL/kg) [id number])  [id number])      OUTPUT   Urine(mL/kg/hr) 850(1) 1200(1.4) 2050(1.2)        Urine Voided         Urine Output (mL) ([REMOVED] Urinary Catheter 06/29/20)  700 700      Shift Total(mL/kg) 850(11.9) 1200(16.7) 9567(66.9)      NET 10 1200 -1190      Weight (kg) 71.7 71.7 71.7 71.7 71.7 71.7       Physical Exam  /79 (BP 1 Location: Left arm, BP Patient Position: At rest)   Pulse 82   Temp 98.5 °F (36.9 °C)   Resp 16   Wt 158 lb (71.7 kg)   SpO2 97%   BMI 25.50 kg/m²      General:  alert, cooperative, no distress     Cardiac:  Regular rate and rhythm        Lungs:  nonlabored  Abdomen:  soft, distended/tympani, BS absent.  Tenderness mild - periwound, without guarding, without rebound       Wound:  clean, dry, dressed   Extremity: no edema    Wt Readings from Last 3 Encounters:   06/29/20 158 lb (71.7 kg)   04/23/20 152 lb 12.8 oz (69.3 kg)         Data Review      Recent Labs     06/30/20  0630 06/29/20  0906   WBC 8.9 4.8   ANEU  --  2.7   HGB 13.3 14.1   HCT 40.7 44.3    112*     Recent Labs     06/30/20  0630 06/29/20  0906    143   K 4.5 4.1   * 111*   * 85   BUN 11 23*   CREA 0.71 0.68   CA 8.0* 8.9   ALB  --  3.8   TBILI  --  0.5   ALT  --  18         Assessment/Plan:     Admitted for Pelvic mass [R19.00]  Pelvic mass in female [R19.00]    Active Hospital Problems Diagnosis Date Noted    Pelvic mass in female 06/29/2020    Pelvic mass 04/24/2020       Alba Molina 2 Day Post-Op Procedure(s):  EXPLORATORY LAPAROTOMY, RESECTION OF MASS, TOTAL ABDOMINAL HYSTERECTOMY, BILATERAL SALPINGINGO OOPHORECTOMY,  OMENTECTOMY, URETEROLYSIS for PELVIC MASS    Onc: mucinous cystadenoma on frozen. Await final path  Heme/CV: Hemodynamically stable  Renal: indices baseline  FEN/GI: diet as tolerated  ID/Wound: afeb, abd distended. Progress slowly  Neuro: pain well controlled. PPX: ambulate, SCDs, lovenox  Disposition: Doing well postoperatively. Continue routine postop care. Lissy Barber PA-C    Attending Attestation    I have seen and examined the above patient and agree with the care provider's assessment and plan. Continue routine postop care. Will ADAT. Continue to encourage ambulation and IS. VTE prophylaxis.      Ernest Mcardle, MD

## 2020-07-01 NOTE — PROGRESS NOTES
Problem: Mobility Impaired (Adult and Pediatric)  Goal: *Acute Goals and Plan of Care (Insert Text)  Description: FUNCTIONAL STATUS PRIOR TO ADMISSION: Patient was independent and active without use of DME.    HOME SUPPORT PRIOR TO ADMISSION: The patient lived alone has family nearby. Physical Therapy Goals  Initiated 6/30/2020  1. Patient will move from supine to sit and sit to supine , scoot up and down and roll side to side in bed with independence within 7 day(s). 2.  Patient will transfer from bed to chair and chair to bed with independence using the least restrictive device within 7 day(s). 3.  Patient will perform sit to stand with independence within 7 day(s). 4.  Patient will ambulate with modified independence for 300 feet with the least restrictive device within 7 day(s). 5.  Patient will ascend/descend 2 stairs with one handrail(s) with modified independence within 7 day(s). Outcome: Progressing Towards Goal   PHYSICAL THERAPY TREATMENT  Patient: Ghazal Meléndez (37 y.o. female)  Date: 7/1/2020  Diagnosis: Pelvic mass [R19.00]  Pelvic mass in female [R19.00]   <principal problem not specified>  Procedure(s) (LRB):  EXPLORATORY LAPAROTOMY, RESECTION OF MASS, TOTAL ABDOMINAL HYSTERECTOMY, BILATERAL SALPINGINGO OOPHORECTOMY,  OMENTECTOMY, URETEROLYSIS (N/A) 2 Days Post-Op  Precautions: Fall  Chart, physical therapy assessment, plan of care and goals were reviewed. ASSESSMENT  Patient continues with skilled PT services and is progressing towards goals. Patient received supine in bed, asleep, easily woken and agreeable to therapy. Initiated gait training this session, first without device as this is patient's baseline. Gait stiff and with very shortened, shuffled steps without device and when holding onto IV pole. Patient requesting RW and PT agreed-- gait much more steady and patient with increased step length and appearing more confident with device.  Min cues for proximity to RW during use. Returned to chair and encouraged to ask for assist to ambulate again this evening. Patient resting on 2L/min O2, however sats 95% with gait on room air. Left O2 off and RN aware. She is below baseline but anticipate she could d/c home with HHPT and increased family support. Attempted to ask how her sister could assist her. Patient vague with details (stated her sister won't come into her home because of allergies). Reiterated importance of increased assistance within the home, at least for home management that includes lifting (which is limited after abdominal surgery). Current Level of Function Impacting Discharge (mobility/balance): supervision with RW, min A for log roll     Other factors to consider for discharge: lives alone, will need increased family assistance-- should someone speak with her sister to reiterate this? PLAN :  Patient continues to benefit from skilled intervention to address the above impairments. Continue treatment per established plan of care. to address goals. Recommendation for discharge: (in order for the patient to meet his/her long term goals)  Physical therapy at least 2 days/week in the home AND ensure assist and/or supervision for safety with household mobility and management     This discharge recommendation:  Has been made in collaboration with the attending provider and/or case management    IF patient discharges home will need the following DME: rolling walker ordered today       SUBJECTIVE:   Patient stated I'm a very independent person. I have been my whole life.     OBJECTIVE DATA SUMMARY:   Critical Behavior:  Neurologic State: Alert  Orientation Level: Oriented X4  Functional Mobility Training:  Bed Mobility:  Supine to Sit: Minimum assistance  Transfers:  Sit to Stand: Stand-by assistance  Stand to Sit: Stand-by assistance  Bed to Chair: Minimum assistance(without RW)  Balance:  Sitting: Intact  Standing: Intact; With support  Standing - Static: Good  Standing - Dynamic : Good  Ambulation/Gait Training:  Distance (ft): 300 Feet (ft)(120 ft without RW and 180 with RW)  Assistive Device: Gait belt;Walker, rolling(+ trial holding onto IV pole)  Ambulation - Level of Assistance: Contact guard assistance;Supervision(CGA without RW and supervision with RW)  Gait Abnormalities: Decreased step clearance; Antalgic  Base of Support: Widened  Speed/Louise: Slow  Step Length: Right shortened;Left shortened    Activity Tolerance:   Good and SpO2 stable on RA  Please refer to the flowsheet for vital signs taken during this treatment. After treatment patient left in no apparent distress:   Sitting in chair and Call bell within reach    COMMUNICATION/COLLABORATION:   The patients plan of care was discussed with: Registered nurse.      Lambert Clemente PT, DPT   Time Calculation: 32 mins

## 2020-07-01 NOTE — ROUTINE PROCESS
Rolling walker order faxed to [employer]. Awaiting insurance approval. Will follow up. The Rehabilitation department at [facility] has ordered the following durable medical equipment (DME):  
rolling walker From: 
[employer] [phone] If the rehab department or DME company is waiting for insurance approval for the equipment and the patient decides to discharge from the hospital before the medical equipment arrives, the patient may contact the company above to work out the delivery. Please keep in mind that some DME companies WILL NOT deliver to the home. Insurance companies and DME companies are not open on the weekends to approve authorization and deliver to the hospital. Therefore it is the patient's responsibility to figure out a way to access the DME medical equipment. Thank you so much for your help as we provide the equipment the patient requires.

## 2020-07-01 NOTE — PROGRESS NOTES
Bedside and Verbal shift change report given to JUSTICE Torres (oncoming nurse) by Jose Greenfield (offgoing nurse). Report included the following information SBAR, Kardex, Intake/Output, MAR, Accordion and Recent Results.

## 2020-07-01 NOTE — PROGRESS NOTES
T.O.C.:   Pt expected to d/c to home   Transport TBD   [facility] needed for PT and skilled nursing   Emergency contact: Dayanna Escobar, sister, [phone]: CM met with pt and Matias matson, ([phone]) at pts bedside. Niece wanted clarification of d/c plan; PT 2x / week and skilled nursing (if qualifies). Niece stated she may have pt come to her home for a few days. Niece will contact CM if she has any further questions or needs. Nadege Downing RN      Reason for Admission:   Pelvic Mass                   RUR Score:                     Plan for utilizing home health:    TBD      PCP: First and Last name:  Christi Butcher [phone]   Name of Practice:    Are you a current patient: Yes/No:  yes   Approximate date of last visit:  unknown   Can you participate in a virtual visit with your PCP:  Unsure, pt uses [employer] mobile phone                    Current Advanced Directive/Advance Care Plan: none                         Transition of Care Plan:      D/C to home, F/U with Dr Paige Alcocer in 2 weeks    CM met with pt at bedside, verified demographics, insurance and PCP. Emergency contact is , Dayanna Escobar, [phone]. Pt lives alone with 2 dogs and 2 cats, sister lives nearby. HH needed for PT and skilled nursing, referral sent. Pt has no AMD, does not want to complete at this time.     Care Management Interventions  PCP Verified by CM: Yes(unknown)  Palliative Care Criteria Met (RRAT>21 & CHF Dx)?: No  Transition of Care Consult (CM Consult): Discharge Planning  MyChart Signup: No  Discharge Durable Medical Equipment: No  Physical Therapy Consult: Yes  Occupational Therapy Consult: No  Speech Therapy Consult: No  Current Support Network: Lives Alone, Family Lives Nearby  Confirm Follow Up Transport: Other (see comment)(Unsure of transportation at d/c)  The Plan for Transition of Care is Related to the Following Treatment Goals : medically stable  The Patient and/or Patient Representative was Provided with a Choice of Provider and Agrees with the Discharge Plan?: Yes  Name of the Patient Representative Who was Provided with a Choice of Provider and Agrees with the Discharge Plan: patient  Freedom of Choice List was Provided with Basic Dialogue that Supports the Patient's Individualized Plan of Care/Goals, Treatment Preferences and Shares the Quality Data Associated with the Providers?: Yes  Discharge Location  Discharge Placement: Home with home health    Iva Rosa RN

## 2020-07-02 PROCEDURE — 74011250637 HC RX REV CODE- 250/637: Performed by: PHYSICIAN ASSISTANT

## 2020-07-02 PROCEDURE — 74011250636 HC RX REV CODE- 250/636: Performed by: PHYSICIAN ASSISTANT

## 2020-07-02 PROCEDURE — 65410000002 HC RM PRIVATE OB

## 2020-07-02 PROCEDURE — 74011000250 HC RX REV CODE- 250: Performed by: PHYSICIAN ASSISTANT

## 2020-07-02 PROCEDURE — 97116 GAIT TRAINING THERAPY: CPT

## 2020-07-02 RX ORDER — ENOXAPARIN SODIUM 100 MG/ML
40 INJECTION SUBCUTANEOUS DAILY
Qty: 21 SYRINGE | Refills: 0 | Status: SHIPPED | OUTPATIENT
Start: 2020-07-02 | End: 2020-07-23

## 2020-07-02 RX ADMIN — LEVOTHYROXINE SODIUM 75 MCG: 0.07 TABLET ORAL at 07:17

## 2020-07-02 RX ADMIN — ENOXAPARIN SODIUM 40 MG: 40 INJECTION SUBCUTANEOUS at 10:28

## 2020-07-02 RX ADMIN — DOCUSATE SODIUM 100 MG: 100 CAPSULE, LIQUID FILLED ORAL at 10:27

## 2020-07-02 RX ADMIN — ACETAMINOPHEN 975 MG: 325 TABLET ORAL at 00:35

## 2020-07-02 RX ADMIN — DOCUSATE SODIUM 100 MG: 100 CAPSULE, LIQUID FILLED ORAL at 17:08

## 2020-07-02 RX ADMIN — PAROXETINE HYDROCHLORIDE 20 MG: 20 TABLET, FILM COATED ORAL at 10:28

## 2020-07-02 RX ADMIN — ACETAMINOPHEN 975 MG: 325 TABLET ORAL at 13:51

## 2020-07-02 RX ADMIN — OXYBUTYNIN CHLORIDE 10 MG: 5 TABLET, EXTENDED RELEASE ORAL at 10:28

## 2020-07-02 RX ADMIN — Medication 10 ML: at 22:36

## 2020-07-02 RX ADMIN — ACETAMINOPHEN 975 MG: 325 TABLET ORAL at 07:17

## 2020-07-02 NOTE — PROGRESS NOTES
T.O.C.:              Pt expected to d/c to home              Transport TBD              [facility] needed for PT and skilled nursing              Emergency contact: Griselda Kea, sister, [phone]      1300: Trini Page has accepted pt. YES response from [address] [place] [address] ([id number]) re: Referral [id number] for patient in [id number]: Yes, willing to accept patient Josiah Mckeon Javier, We can accept with a [facility] for Tuesday 7-7-20    Danika Odonnell RN CM contacted several [facility] agencies to obtain PT and nursing services in [facility]. At present, no  agencies are able to accept pt due to lack of staff availability. STEFFANY continues to pursue an accepting agency.     Danika Odonnell RN

## 2020-07-02 NOTE — PROGRESS NOTES
Gynecologic Oncology - [address], [address], [address]  P [phone]  F [phone]       Patient: Calos Urbano  Admit Date: 6/29/2020  Admit Dx: Pelvic mass [R19.00]  Pelvic mass in female [R19.00]    Subjective:     No events, feels well. Pain minimal. No flatus. No N/V. Ambulated x 1 with walker/PT. Objective:     Date 07/01/20 0700 - 07/02/20 0659 07/02/20 0700 - 07/03/20 0659   Shift 2004-4863 2813-7177 24 Hour Total 0972-8512 7190-6747 24 Hour Total   INTAKE   Shift Total(mL/kg)         OUTPUT   Urine(mL/kg/hr) 650(0.8)  650(0.4)        Urine Voided 650  650        Urine Occurrence(s) 1 x  1 x      Shift Total(mL/kg) 650(9.1)  650(9.1)      NET -650  -650      Weight (kg) 71.7 71.7 71.7 71.7 71.7 71.7       Physical Exam  /63 (BP 1 Location: Right arm, BP Patient Position: At rest)   Pulse 82   Temp 98 °F (36.7 °C)   Resp 16   Wt 158 lb (71.7 kg)   SpO2 97%   BMI 25.50 kg/m²      General:  alert, cooperative, no distress     Cardiac:  Regular rate and rhythm        Lungs:  nonlabored  Abdomen:  soft, distended/tympani, BS+. Tenderness minimal - periwound, without guarding, without rebound       Wound:  clean, dry, dressed   Extremity: no edema    Wt Readings from Last 3 Encounters:   06/29/20 158 lb (71.7 kg)   04/23/20 152 lb 12.8 oz (69.3 kg)         Data Review      Recent Labs     06/30/20  0630 06/29/20  0906   WBC 8.9 4.8   ANEU  --  2.7   HGB 13.3 14.1   HCT 40.7 44.3    112*     Recent Labs     06/30/20  0630 06/29/20  0906    143   K 4.5 4.1   * 111*   * 85   BUN 11 23*   CREA 0.71 0.68   CA 8.0* 8.9   ALB  --  3.8   TBILI  --  0.5   ALT  --  18        FINAL PATHOLOGIC DIAGNOSIS   1. 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  Uterus, right tube and ovary; hysterectomy and right salpingo-oophorectomy, 68 g:   Benign endometrial polyps, three   Benign endocervical polyp   Leiomyomata, intramyometrial and subserosal, up to 2 cm in greatest individual dimension   Benign right fallopian tube and ovary   Negative for malignancy   3. Right paracolic gutter, biopsy:   Negative for malignancy   4. Left paracolic gutter, biopsy:   Negative for malignancy   5. 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   Tumor Size: 26 cm   Ovarian Surface Involvement: Absent   Implants: Not identified   Other Tissue / Organ Involvement: Not identified   Peritoneal / Ascitic Fluid: Not submitted / unknown   LYMPH NODES   Regional Lymph Nodes: No lymph nodes submitted or found   PATHOLOGIC STAGE CLASSIFICATION (pTNM, AJCC 8th Edition)   Primary Tumor (pT): pT1a   Regional Lymph Nodes (pN): pNX       Assessment/Plan:     Admitted for Pelvic mass [R19.00]  Pelvic mass in female [R19.00]    Active Hospital Problems    Diagnosis Date Noted    Pelvic mass in female 06/29/2020    Pelvic mass 04/24/2020       Angelica Chiu 3 Day Post-Op Procedure(s):  EXPLORATORY LAPAROTOMY, RESECTION OF MASS, TOTAL ABDOMINAL HYSTERECTOMY, BILATERAL SALPINGINGO OOPHORECTOMY,  OMENTECTOMY, URETEROLYSIS for PELVIC MASS    Onc: mucinous borderline tumor, intestinal type. Appendix absent. No findings on running bowel. Heme/CV: Hemodynamically stable  Renal: indices baseline  FEN/GI: diet as tolerated  ID/Wound: afeb, abd distended. Progress slowly  Neuro: pain well controlled/absent. PPX: ambulate, SCDs, lovenox. Ambulate more today with staff/PT  Disposition: Doing well postoperatively.  Continue routine postop care. 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 Lives alone, has pets, dano is an RN. Dorota Mirza PA-C    Addendum: spoke with sister. Possibly family able to take [facility] for a few days. HH PT will be ordered. Dano Zcahary Bowie is an RN, offered to let [facility] stay, as has Rebecca Fitchsolomon her sister. [facility] has been resistant, will work with her. Better for all if she can be discharged on Saturday for coordinating availabilities. Dorota Mirza PA-C    Attending Attestation    I have seen and examined the above patient and agree with the care provider's assessment and plan. Continue routine postop care. Continue to work with social work and the patient's family for optimization at the time of discharge. She will need care at home. She will not be able to be left alone initially during her recovery. Encourage ambulation and IS.      Ximena Hawkins MD

## 2020-07-02 NOTE — PROGRESS NOTES
Problem: Mobility Impaired (Adult and Pediatric)  Goal: *Acute Goals and Plan of Care (Insert Text)  Description: FUNCTIONAL STATUS PRIOR TO ADMISSION: Patient was independent and active without use of DME.    HOME SUPPORT PRIOR TO ADMISSION: The patient lived alone has family nearby. Physical Therapy Goals  Initiated 6/30/2020  1. Patient will move from supine to sit and sit to supine , scoot up and down and roll side to side in bed with independence within 7 day(s). 2.  Patient will transfer from bed to chair and chair to bed with independence using the least restrictive device within 7 day(s). 3.  Patient will perform sit to stand with independence within 7 day(s). 4.  Patient will ambulate with modified independence for 300 feet with the least restrictive device within 7 day(s). 5.  Patient will ascend/descend 2 stairs with one handrail(s) with modified independence within 7 day(s). Outcome: Progressing Towards Goal   PHYSICAL THERAPY TREATMENT  Patient: Afshin Schultz (56 y.o. female)  Date: 7/2/2020  Diagnosis: Pelvic mass [R19.00]  Pelvic mass in female [R19.00]   <principal problem not specified>  Procedure(s) (LRB):  EXPLORATORY LAPAROTOMY, RESECTION OF MASS, TOTAL ABDOMINAL HYSTERECTOMY, BILATERAL SALPINGINGO OOPHORECTOMY,  OMENTECTOMY, URETEROLYSIS (N/A) 3 Days Post-Op  Precautions: Fall  Chart, physical therapy assessment, plan of care and goals were reviewed. ASSESSMENT  Patient continues with skilled PT services and is progressing towards goals. Patient demonstrates improvements in transfers and ambulation. Pt received RW and adjusted to height. Pt ambulated with fair vahe and steadiness. Pt able to negotiate 4 steps using HR x supervision. Pt reports she does not have railings on 2 [address]OJANA. Pt practiced 2 steps with no HR requiring CGA using step to pattern. Educated pt to have sister or friend while navigating stairs.  Pt then returned to room and transferred to bed to rest. Per MD, pt may plan to stay with sister    Current Level of Function Impacting Discharge (mobility/balance): supervision for ambulation, CGA for stair negotiation     Other factors to consider for discharge: fall risk, 2 YOJANA         PLAN :  Patient continues to benefit from skilled intervention to address the above impairments. Continue treatment per established plan of care. to address goals. Recommendation for discharge: (in order for the patient to meet his/her long term goals)  Physical therapy at least 2 days/week in the home AND ensure assist and/or supervision for safety with mobility, IADLs     This discharge recommendation:  Has been made in collaboration with the attending provider and/or case management    IF patient discharges home will need the following DME: patient owns DME required for discharge (rolling walker delivered)       SUBJECTIVE:   Patient stated you know how men are. ..    OBJECTIVE DATA SUMMARY:   Critical Behavior:  Neurologic State: Alert  Orientation Level: Oriented X4        Functional Mobility Training:  Bed Mobility:     Supine to Sit: Supervision              Transfers:  Sit to Stand: Supervision  Stand to Sit: Supervision                             Balance:  Sitting: Intact  Standing: Intact; With support  Ambulation/Gait Training:  Distance (ft): 100 Feet (ft)  Assistive Device: Gait belt;Walker, rolling  Ambulation - Level of Assistance: Supervision; Adaptive equipment; Additional time        Gait Abnormalities: Decreased step clearance        Base of Support: Widened     Speed/Louise: Slow  Step Length: Left shortened;Right shortened  Swing Pattern: Left asymmetrical;Right asymmetrical       Stairs:  Number of Stairs Trained: 2  Stairs - Level of Assistance: Contact guard assistance; Additional time   Rail Use: None      Activity Tolerance:   Good  Please refer to the flowsheet for vital signs taken during this treatment.     After treatment patient left in no apparent distress:   Supine in bed, Call bell within reach, and Side rails x 3    COMMUNICATION/COLLABORATION:   The patients plan of care was discussed with: Registered nurse, Physician, and Case management.      Cara Perea, PT, DPT   Time Calculation: 15 mins

## 2020-07-02 NOTE — PROGRESS NOTES
Bedside and Verbal shift change report given to DEENA Murphy (oncoming nurse) by Shadia Brower. Kaila Hedrick RN (offgoing nurse). Report included the following information SBAR, Kardex, OR Summary, Procedure Summary, Intake/Output, MAR, Accordion and Recent Results. 2050:  RN at bedside performing shift assessment. Pt denies n/v/sob, states she has no pain. RN encourage pt to for a walk in the hallway at this time, pt states she would like to just do it in the morning and states she has already walked with PT today. RN explained to pt that the more she walks the better for her recovery and she will start passing flatus. Pt seems to understand, but is adamant to just walk in the morning.

## 2020-07-03 PROCEDURE — 74011000250 HC RX REV CODE- 250: Performed by: PHYSICIAN ASSISTANT

## 2020-07-03 PROCEDURE — 74011250636 HC RX REV CODE- 250/636: Performed by: PHYSICIAN ASSISTANT

## 2020-07-03 PROCEDURE — 74011250637 HC RX REV CODE- 250/637: Performed by: PHYSICIAN ASSISTANT

## 2020-07-03 PROCEDURE — 65410000002 HC RM PRIVATE OB

## 2020-07-03 PROCEDURE — 74011250636 HC RX REV CODE- 250/636: Performed by: OBSTETRICS & GYNECOLOGY

## 2020-07-03 RX ADMIN — Medication 10 ML: at 14:49

## 2020-07-03 RX ADMIN — ONDANSETRON 4 MG: 2 INJECTION INTRAMUSCULAR; INTRAVENOUS at 15:30

## 2020-07-03 RX ADMIN — SODIUM CHLORIDE, SODIUM LACTATE, POTASSIUM CHLORIDE, AND CALCIUM CHLORIDE 75 ML/HR: 600; 310; 30; 20 INJECTION, SOLUTION INTRAVENOUS at 08:21

## 2020-07-03 RX ADMIN — ACETAMINOPHEN 975 MG: 325 TABLET ORAL at 17:42

## 2020-07-03 RX ADMIN — DOCUSATE SODIUM 100 MG: 100 CAPSULE, LIQUID FILLED ORAL at 17:42

## 2020-07-03 RX ADMIN — ACETAMINOPHEN 975 MG: 325 TABLET ORAL at 00:16

## 2020-07-03 RX ADMIN — SODIUM CHLORIDE 5 MG: 9 INJECTION INTRAMUSCULAR; INTRAVENOUS; SUBCUTANEOUS at 14:48

## 2020-07-03 RX ADMIN — Medication 10 ML: at 06:28

## 2020-07-03 RX ADMIN — ACETAMINOPHEN 975 MG: 325 TABLET ORAL at 06:27

## 2020-07-03 RX ADMIN — SODIUM CHLORIDE 500 ML: 900 INJECTION, SOLUTION INTRAVENOUS at 15:47

## 2020-07-03 RX ADMIN — Medication 10 ML: at 21:33

## 2020-07-03 RX ADMIN — Medication 10 ML: at 14:50

## 2020-07-03 RX ADMIN — ENOXAPARIN SODIUM 40 MG: 40 INJECTION SUBCUTANEOUS at 08:21

## 2020-07-03 RX ADMIN — ONDANSETRON 4 MG: 2 INJECTION INTRAMUSCULAR; INTRAVENOUS at 21:33

## 2020-07-03 RX ADMIN — OXYBUTYNIN CHLORIDE 10 MG: 5 TABLET, EXTENDED RELEASE ORAL at 08:20

## 2020-07-03 RX ADMIN — ACETAMINOPHEN 975 MG: 325 TABLET ORAL at 12:17

## 2020-07-03 RX ADMIN — DOCUSATE SODIUM 100 MG: 100 CAPSULE, LIQUID FILLED ORAL at 08:20

## 2020-07-03 RX ADMIN — LEVOTHYROXINE SODIUM 75 MCG: 0.07 TABLET ORAL at 06:30

## 2020-07-03 RX ADMIN — PAROXETINE HYDROCHLORIDE 20 MG: 20 TABLET, FILM COATED ORAL at 08:20

## 2020-07-03 NOTE — PROGRESS NOTES
Gynecologic Oncology - [address], [address], [address] BAKER [phone]  F [phone]       Patient: Calos Urbano  Admit Date: 6/29/2020  Admit Dx: Pelvic mass [R19.00]  Pelvic mass in female [R19.00]    Subjective:     No events, feels well. Pain minimal. No flatus. No N/V. Ambulated x 1 with walker/PT. Objective:     Date 07/02/20 0700 - 07/03/20 0659 07/03/20 0700 - 07/04/20 0659   Shift 9655-6925 7271-3403 24 Hour Total 6425-2441 4515-7095 24 Hour Total   INTAKE   P.O.  240 240        P. O.  240 240      I. V.(mL/kg/hr)  848(1) 848(0.5)        Volume (lactated Ringers infusion)  848 848      Shift Total(mL/kg)  8066(91.7) 4159(90.7)      OUTPUT   Urine(mL/kg/hr)  300(0.3) 300(0.2)        Urine Voided  300 300        Urine Occurrence(s)  2 x 2 x      Shift Total(mL/kg)  300(4.2) 300(4.2)      NET  788 788      Weight (kg) 71.7 71.7 71.7 71.7 71.7 71.7       Physical Exam  /78 (BP 1 Location: Right arm, BP Patient Position: At rest)   Pulse 60   Temp 99.2 °F (37.3 °C) Comment: pt instructed to use incentivespirometer  Resp 16   Ht 5' 5.98\" (1.676 m)   Wt 158 lb (71.7 kg)   SpO2 96%   BMI 25.51 kg/m²      General:  alert, cooperative, no distress     Cardiac:  Regular rate and rhythm        Lungs:  nonlabored  Abdomen:  soft, distended/tympani, BS+. Tenderness minimal - periwound, without guarding, without rebound       Wound: Dressing removed today on rounds. Extremity: no edema    Wt Readings from Last 3 Encounters:   06/29/20 158 lb (71.7 kg)   04/23/20 152 lb 12.8 oz (69.3 kg)         Data Review      No results for input(s): WBC, ANEU, HGB, HCT, PLT, HGBEXT, HCTEXT, PLTEXT, HGBEXT, HCTEXT, PLTEXT in the last 72 hours. No results for input(s): NA, K, CL, GLU, BUN, CREA, CA, MG, PHOS, ALB, TBIL, TBILI, ALT in the last 72 hours. No lab exists for component: CO3, ALBP, SGOT     FINAL PATHOLOGIC DIAGNOSIS   1.  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 Uterus, right tube and ovary; hysterectomy and right salpingo-oophorectomy, 68 g:   Benign endometrial polyps, three   Benign endocervical polyp   Leiomyomata, intramyometrial and subserosal, up to 2 cm in greatest individual dimension   Benign right fallopian tube and ovary   Negative for malignancy   3. Right paracolic gutter, biopsy:   Negative for malignancy   4. Left paracolic gutter, biopsy:   Negative for malignancy   5. 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   Tumor Size: 26 cm   Ovarian Surface Involvement: Absent   Implants: Not identified   Other Tissue / Organ Involvement: Not identified   Peritoneal / Ascitic Fluid: Not submitted / unknown   LYMPH NODES   Regional Lymph Nodes: No lymph nodes submitted or found   PATHOLOGIC STAGE CLASSIFICATION (pTNM, AJCC 8th Edition)   Primary Tumor (pT): pT1a   Regional Lymph Nodes (pN): pNX       Assessment/Plan:     Admitted for Pelvic mass [R19.00]  Pelvic mass in female [R19.00]    Active Hospital Problems    Diagnosis Date Noted    Pelvic mass in female 06/29/2020    Pelvic mass 04/24/2020       Andie Dudley 3 Day Post-Op Procedure(s):  EXPLORATORY LAPAROTOMY, RESECTION OF MASS, TOTAL ABDOMINAL HYSTERECTOMY, BILATERAL SALPINGINGO OOPHORECTOMY,  OMENTECTOMY, URETEROLYSIS for PELVIC MASS    Onc: mucinous borderline tumor, intestinal type. Appendix absent. No findings on running bowel. No adjuvant treatment indicated  Heme/CV: Hemodynamically stable.  VSS WNL  Renal: indices baseline  FEN/GI: diet as tolerated  ID/Wound: afeb, abd distended. Progress slowly. Dressing removed on rounds today. Neuro: pain well controlled/absent. PPX: ambulate, SCDs, lovenox. Ambulate more today with staff/PT  Disposition: Doing well postoperatively. Continue routine postop care. 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 Lives alone, has pets, niece is an RN. The plan is for her to be able to be discharged on Saturday to go home with her niece Jj Teran or her sister German Fleming. Continue routine postop care.          Clark Duran MD

## 2020-07-03 NOTE — PROGRESS NOTES
Bedside shift change report given to Parag Owens RN (oncoming nurse) by Loraine Mcallister RN (offgoing nurse). Report included SBAR, Kardex, Intake/Output, MAR, Recent Results and Progress of Care. I accept this patient to my care at this time. Any subsequent documentation in this Progress Note is intended to be information adjunct to other components of the patient's electronic medical record. Refer to accompanying timestamp(s) for chronology. 7:47 AM:  Continued care post STEPHANY. With purwick. Some confusion over night-- would pull purwick out. Awake this morning laying in bed. Dressing removed; midline now open to air-- hemostatic    12:37 PM:  Pt ambulated with assistance x1 lap around the nurse's station. Linens soiled and changed at this time; antiseptic cloth used to clean mattress. 3:56 PM:  Pt with sudden onset and emesis ~1000mL over the last 90 minutes. Vitals updated and spoke with Sharee Hopkins MD who gives telephone order w/readback for 500mL bolus NS/30min, increase LR infusion to 125mL, and change to clear liquid diet. Discussed this with Yolande Guan at [phone] who is in agreement with plan of care. I requested CM to reach out to Jim Guan to confirm home PT setup.

## 2020-07-03 NOTE — ROUTINE PROCESS
Bedside shift change report given to YUDITH Cintron RN (oncoming nurse) by Artie Richard. Wallace Samuel RN (offgoing nurse). Report included the following information SBAR, Kardex, MAR and Recent Results.

## 2020-07-03 NOTE — PROGRESS NOTES
Bedside and Verbal shift change report given to ELIEZER Ghotra RN (oncoming nurse) by YUDITH Lynn RN (offgoing nurse). Report included the following information SBAR, Kardex and OR Summary.

## 2020-07-04 VITALS
HEIGHT: 66 IN | OXYGEN SATURATION: 96 % | BODY MASS INDEX: 25.39 KG/M2 | HEART RATE: 88 BPM | SYSTOLIC BLOOD PRESSURE: 110 MMHG | TEMPERATURE: 98.3 F | WEIGHT: 158 LBS | RESPIRATION RATE: 16 BRPM | DIASTOLIC BLOOD PRESSURE: 63 MMHG

## 2020-07-04 PROCEDURE — 74011250637 HC RX REV CODE- 250/637: Performed by: PHYSICIAN ASSISTANT

## 2020-07-04 PROCEDURE — 74011250636 HC RX REV CODE- 250/636: Performed by: PHYSICIAN ASSISTANT

## 2020-07-04 RX ADMIN — OXYBUTYNIN CHLORIDE 10 MG: 5 TABLET, EXTENDED RELEASE ORAL at 08:25

## 2020-07-04 RX ADMIN — ACETAMINOPHEN 975 MG: 325 TABLET ORAL at 00:20

## 2020-07-04 RX ADMIN — DOCUSATE SODIUM 100 MG: 100 CAPSULE, LIQUID FILLED ORAL at 08:25

## 2020-07-04 RX ADMIN — ACETAMINOPHEN 975 MG: 325 TABLET ORAL at 06:39

## 2020-07-04 RX ADMIN — ONDANSETRON 4 MG: 2 INJECTION INTRAMUSCULAR; INTRAVENOUS at 08:25

## 2020-07-04 RX ADMIN — PAROXETINE HYDROCHLORIDE 20 MG: 20 TABLET, FILM COATED ORAL at 08:25

## 2020-07-04 RX ADMIN — ONDANSETRON 4 MG: 2 INJECTION INTRAMUSCULAR; INTRAVENOUS at 12:24

## 2020-07-04 RX ADMIN — LEVOTHYROXINE SODIUM 75 MCG: 0.07 TABLET ORAL at 07:15

## 2020-07-04 RX ADMIN — ENOXAPARIN SODIUM 40 MG: 40 INJECTION SUBCUTANEOUS at 08:24

## 2020-07-04 NOTE — PROGRESS NOTES
Bedside and Verbal shift change report given to [address] (oncoming nurse) by Odalis Bender (offgoing nurse). Report included the following information SBAR, Kardex, OR Summary, Procedure Summary, Intake/Output and MAR.     0800- Assessment complete, pain controlled with tylenol, no report of nausea, last emesis was at 2100 7/3/20    0900- patients niece called and notified of Sandra Wilfredone will see her Tuesday. 1000- patient drank ensure and ate 50% of breakfast, says she feels much better than she did yesterday, but still scared to eat if it could make her throw up. No current report of nausea. 1200- patient niece called to follow up on ride for DC    1400- I have reviewed discharge instructions with the patient. The patient and caregiver verbalized understanding. Discharge medications reviewed with patient and caregiver and appropriate educational materials and side effects teaching were provided. Waiting for her ride.

## 2020-07-04 NOTE — PROGRESS NOTES
Bedside and Verbal shift change report given to JONG Alegria RN (oncoming nurse) by GLEN Olson RN (offgoing nurse). Report included the following information SBAR, Kardex, Intake/Output, MAR, Accordion and Recent Results. 2124- Pt had episode of emesis, 300cc. 2133- RN gave PRN zofran. 2200- Pt resting comfortably.

## 2020-07-04 NOTE — PROGRESS NOTES
Problem: Pressure Injury - Risk of  Goal: *Prevention of pressure injury  Description: Document Inder Scale and appropriate interventions in the flowsheet. Outcome: Progressing Towards Goal  Note: Pressure Injury Interventions:       Moisture Interventions: Check for incontinence Q2 hours and as needed, Absorbent underpads    Activity Interventions: Increase time out of bed    Mobility Interventions: HOB 30 degrees or less    Nutrition Interventions: Document food/fluid/supplement intake                     Problem: Patient Education: Go to Patient Education Activity  Goal: Patient/Family Education  Outcome: Progressing Towards Goal     Problem: Falls - Risk of  Goal: *Absence of Falls  Description: Document Radha Fall Risk and appropriate interventions in the flowsheet.   Outcome: Progressing Towards Goal  Note: Fall Risk Interventions:  Mobility Interventions: OT consult for ADLs, Patient to call before getting OOB    Mentation Interventions: Door open when patient unattended    Medication Interventions: Patient to call before getting OOB    Elimination Interventions: Call light in reach              Problem: Patient Education: Go to Patient Education Activity  Goal: Patient/Family Education  Outcome: Progressing Towards Goal     Problem: Pain  Goal: *Control of Pain  Outcome: Progressing Towards Goal     Problem: Patient Education: Go to Patient Education Activity  Goal: Patient/Family Education  Outcome: Progressing Towards Goal     Problem: Patient Education: Go to Patient Education Activity  Goal: Patient/Family Education  Outcome: Progressing Towards Goal     Problem: Abdominal Hysterectomy: Discharge Outcomes  Goal: *Afebrile  Outcome: Progressing Towards Goal  Goal: *Demonstrates progressive activity  Outcome: Progressing Towards Goal  Goal: *Tolerating diet  Outcome: Progressing Towards Goal  Goal: *Hemodynamically stable  Outcome: Progressing Towards Goal  Goal: *Describes available resources and support systems  Outcome: Progressing Towards Goal  Goal: *Optimal pain control at patient's stated goal  Outcome: Progressing Towards Goal  Goal: *Verbalizes understanding and describes medication purposes and frequencies  Outcome: Progressing Towards Goal  Goal: *Lungs clear or at baseline  Outcome: Progressing Towards Goal  Goal: *Voiding  Outcome: Progressing Towards Goal  Goal: *Active bowel sounds  Outcome: Progressing Towards Goal  Goal: *Passing flatus  Outcome: Progressing Towards Goal  Goal: *Verbalizes and demonstrates incision care  Outcome: Progressing Towards Goal  Goal: *Expresses feelings about diagnosis and surgery  Outcome: Progressing Towards Goal  Goal: *Received and verbalizes understanding of discharge plan and instructions  Outcome: Progressing Towards Goal     Problem: Patient Education: Go to Patient Education Activity  Goal: Patient/Family Education  Outcome: Progressing Towards Goal

## 2020-07-04 NOTE — DISCHARGE INSTRUCTIONS
[address] [facility] [address], [address]  P [phone]  F [phone]     [place] [address]      Dear Ms. Kay Moreno,      Please review your instructions with your nurse and ask any questions so you have all the information you need to recover well at home. If you do not feel you have everything you need to succeed and be safe after you leave the hospital, please discuss these concerns with your nurse. As always, call for any questions at home. Your doctor: Dr. Reynaldo Campbell  Diagnosis: Pelvic mass [R19.00]  Pelvic mass in female [R19.00]  Procedure: Procedure(s):  EXPLORATORY LAPAROTOMY, RESECTION OF MASS, TOTAL ABDOMINAL HYSTERECTOMY, BILATERAL SALPINGINGO OOPHORECTOMY,  OMENTECTOMY, URETEROLYSIS  Date of Discharge: ***      Take Home Medications     See Discharge Medication Review provided to you by your nurse. If you did not receive one, request this prior to your discharge. · It is important that you take your medications as they are prescribed. · Keep your medications in the bottles provided by the pharmacist and keep a list of the medication names, dosages, times to be taken and what they are for in your wallet. · Do not take other medications without consulting your doctor. · If you are prescribed enoxaparin (Lovenox) and are taking a baby aspirin daily, please hold this medication until your course of enoxaparin is completed. · If you no longer need your prescribed pain medication, you may take Tylenol or Aleve alone. · You should take a daily gentle stool softener such as a colace pill or dulcolax suppository for constipation as this is not uncommon after surgery and/or while on pain medication. If not prescribed, this can be found over the counter. If constipation persists for >24 hours you should take a dose of Milk of Magnesia. Call if your constipation continues.       Diet    · Stay hydrated and drink fluids such as gatorade and water. This will also help prevent constipation and dehydration. Limit somewhat any usual caffeine intake of beverages such as soda, tea and coffee as this may serve to dehydrate you. · For the first 2-3 days keep a low fiber diet avoiding raw vegetables or fruits with skin. A diet consisting of soup, cereal, yogurt, eggs, fish, Boost/Ensure. Avoid fatty/greasy foods. · If nauseated, keep your diet limited to liquids and call if this persists. Activity    · If possible, have someone with you at all times until you feel stable. · Gradually increase your activity each day. There are generally no restrictions on walking, climbing stairs or riding in a car. Try to walk at least 4 times per day. · Showers are okay. If you have an incision, no tub bathing/swimming for two weeks. Walking will help reduce the risk of blood clots and constipation. · No driving for 2 week and/or while on pain medication. · The following restrictions apply:  1. No heavy lifting greater than 15 lbs for 8 weeks. 2. Nothing per vagina for 8 weeks. 3. You may experience vaginal spotting. Should the bleeding require more that 4 pads a day please call our office. Incision    · You should expect some discomfort in the area of your incision, particularly as you increase your activity. If you notice an area of increasing redness or new drainage, please call your doctor. · If you have staples, they are generally removed in 10-14 days at your appointment   · Many incisions will have buried, absorbable sutures, which do not need to be removed and are covered by protective glue. This will come off over time. Causes For Concern    If any of the following occur, please call our office and speak with the Nurse/aid who will help you with your problem or ask the doctor to call you.  Problems with the incision, including increasing pain, swelling, redness or drainage.     Inability to pass urine    Increasing abdominal pain despite medication   Persisting nausea or vomiting.  Fever or chills and a temperature >101F   Constipation (no bowel movement for three days).  Diarrhea (more than three watery stools within 24 hours).  Excessive vaginal or wound bleeding.  If after hours and you cannot reach an on-call physician, call 911. Follow-Up    Call [phone] to schedule an appointment with Dr. Violet Coello in 2 weeks for staple removal.        Information obtained by :  I understand that if any problems occur once I am at home I am to contact my physician. I understand and acknowledge receipt of the instructions indicated above.                                                                                                                                            Physician's or R.N.'s Signature                                                                  Date/Time                                                                                                                                              Patient or Representative Signature                                                          Date/Time

## 2020-07-04 NOTE — PROGRESS NOTES
Gynecologic Oncology - [address], [address], [address]  P [phone]  F [phone]       Patient: Arabella Campos  Admit Date: 6/29/2020  Admit Dx: Pelvic mass [R19.00]  Pelvic mass in female [R19.00]    Subjective:     No events, feels well. Pain minimal. Episode of N/V yesterday, now resolved. Ambulating with walker/PT. Objective:     Date 07/03/20 0700 - 07/04/20 0659 07/04/20 0700 - 07/05/20 0659   Shift 7329-6445 1782-0326 24 Hour Total 6312-0368 0713-7761 24 Hour Total   INTAKE   I.V.(mL/kg/hr) 1994.4(2.3) 187.5(0.2) 2181.9(1.3)        Volume (sodium chloride 0.9 % bolus infusion 500 mL) 500  500        Volume (lactated Ringers infusion) 1494.4 187. 5 1681.9      Shift Total(mL/kg) 8067.7(87.6) 187.5(2.6) 2181. 9(30.4)      OUTPUT   Urine(mL/kg/hr)           Urine Occurrence(s) 2 x 3 x 5 x 1 x  1 x   Emesis/NG output 9158 847 1983        Emesis 4125 731 2994      Shift Total(mL/kg) 1000(14) 300(4.2) 1300(18.1)      .4 -112.5 881.9      Weight (kg) 71.7 71.7 71.7 71.7 71.7 71.7       Physical Exam  /67 (BP 1 Location: Right arm, BP Patient Position: At rest)   Pulse 81   Temp 98.5 °F (36.9 °C)   Resp 16   Ht 5' 5.98\" (1.676 m)   Wt 158 lb (71.7 kg)   SpO2 94%   BMI 25.51 kg/m²      General:  alert, cooperative, no distress     Cardiac:  Regular rate and rhythm        Lungs:  nonlabored  Abdomen:  soft, distended/tympani, BS+. Tenderness minimal - periwound, without guarding, without rebound       Wound: Dressing removed today on rounds. Extremity: no edema    Wt Readings from Last 3 Encounters:   06/29/20 158 lb (71.7 kg)   04/23/20 152 lb 12.8 oz (69.3 kg)         Data Review      No results for input(s): WBC, ANEU, HGB, HCT, PLT, HGBEXT, HCTEXT, PLTEXT, HGBEXT, HCTEXT, PLTEXT in the last 72 hours. No results for input(s): NA, K, CL, GLU, BUN, CREA, CA, MG, PHOS, ALB, TBIL, TBILI, ALT in the last 72 hours.     No lab exists for component: CO3, ALBP, SGOT     FINAL PATHOLOGIC DIAGNOSIS   1. 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 Uterus, right tube and ovary; hysterectomy and right salpingo-oophorectomy, 68 g:   Benign endometrial polyps, three   Benign endocervical polyp   Leiomyomata, intramyometrial and subserosal, up to 2 cm in greatest individual dimension   Benign right fallopian tube and ovary   Negative for malignancy   3. Right paracolic gutter, biopsy:   Negative for malignancy   4. Left paracolic gutter, biopsy:   Negative for malignancy   5. 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   Tumor Size: 26 cm   Ovarian Surface Involvement: Absent   Implants: Not identified   Other Tissue / Organ Involvement: Not identified   Peritoneal / Ascitic Fluid: Not submitted / unknown   LYMPH NODES   Regional Lymph Nodes: No lymph nodes submitted or found   PATHOLOGIC STAGE CLASSIFICATION (pTNM, AJCC 8th Edition)   Primary Tumor (pT): pT1a   Regional Lymph Nodes (pN): pNX       Assessment/Plan:     Admitted for Pelvic mass [R19.00]  Pelvic mass in female [R19.00]    Active Hospital Problems    Diagnosis Date Noted    Pelvic mass in female 06/29/2020    Pelvic mass 04/24/2020       [facility] 5 Day Post-Op Procedure(s):  EXPLORATORY LAPAROTOMY, RESECTION OF MASS, TOTAL ABDOMINAL HYSTERECTOMY, BILATERAL SALPINGINGO OOPHORECTOMY,  OMENTECTOMY, URETEROLYSIS for PELVIC MASS    Onc: mucinous borderline tumor, intestinal type. Appendix absent. No findings on running bowel.  No adjuvant treatment indicated  Heme/CV: Hemodynamically stable. VSS WNL  Renal: indices baseline  FEN/GI: diet as tolerated  ID/Wound: afeb, abd distended. Progress slowly. Dressing removed on rounds today. Neuro: pain well controlled/absent. PPX: ambulate, SCDs, lovenox. Ambulate more today with staff/PT  Disposition: Doing well postoperatively. Continue routine postop care. 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 Lives alone, has pets, niece is an RN. The plan is for her to be able to be discharged on Saturday to go home with her niece Mira Melton or her sister Antonette Winter.           Keysha Lauren MD

## 2020-07-12 NOTE — DISCHARGE SUMMARY
Discharge Summary     Patient: Camilo Romero MRN: [id number]  SSN: [id number]    YOB: 1945  Age: 76 y.o. Sex: female       Admit Date: 6/29/2020    Discharge Date: 7/12/2020      Admission Diagnoses: Pelvic mass [R19.00]; Pelvic mass in female [R19.00]    Discharge Diagnoses:   Problem List as of 7/4/2020 Date Reviewed: 6/15/2020          Codes Class Noted - Resolved    Pelvic mass in female ICD-10-CM: R19.00  ICD-9-CM: 789.30  6/29/2020 - Present        Hypothyroidism ICD-10-CM: E03.9  ICD-9-CM: 244.9  Unknown - Present        Hypertension ICD-10-CM: I10  ICD-9-CM: 401.9  Unknown - Present        Hypercholesteremia ICD-10-CM: E78.00  ICD-9-CM: 272.0  Unknown - Present        High cholesterol ICD-10-CM: E78.00  ICD-9-CM: 272.0  Unknown - Present        GERD (gastroesophageal reflux disease) ICD-10-CM: K21.9  ICD-9-CM: 530.81  Unknown - Present        Depression ICD-10-CM: F32.9  ICD-9-CM: 311  Unknown - Present        Pelvic mass ICD-10-CM: R19.00  ICD-9-CM: 789.30  4/24/2020 - Present        Elevated CEA ICD-10-CM: R97.0  ICD-9-CM: 795.81  4/24/2020 - Present        Elevated CA 19-9 level ICD-10-CM: R97.8  ICD-9-CM: 795.89  4/24/2020 - Present        PMB (postmenopausal bleeding) ICD-10-CM: N95.0  ICD-9-CM: 627.1  4/24/2020 - Present               Discharge Condition: Good    Hospital Course: Ms. Camilo Romero is a 76 y.o.  postmenopausal female who presents in consultation from Dr. Jaswant Garcia for PMB and large 25cm pelvic mass.     The patient reports vaginal spotting that started in September 2019 and has continued since that time. Denies bleeding like a period or everyday. Denies abdominal pain. Reports some bloating. Denies nausea or vomiting. Reports some constipation. Denies hematochezia. Unclear if change in stool caliber, but it may be thinner. 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  I do not have record of this. Reports last colonoscopy in 2009? Akiko Hernandez Denies hematuria. Denies CP, SOB, fevers, or chills. The patient lives with her son and grandson. She is completely independent though at home.      She presented to Dr. Tara Bee with reports of PMB and underwent a pelvic ultrasound on 3/3/2020 which demonstrated a 25cm pelvic mass and thickened endometrium of 5mm. She subsequently underwent a pelvic MRI on 4/14/2020 which noted a \"25.6cm multiloculated cystic neoplasm with the pelvis\". She underwent tumor markers on 3/27/2020. CEA elevated at 16.3, Ca 19-9 elevated at 761; Ca-125 normal at 18.8. She was subsequently referred to our office for further management. .      Pertinent PMH/PSH: h/o bowel resection (benign tumor?), HTN, ? Early dementia      Active, no restrictions.      Imaging Review:   Pelvic MRI 4/14/2020:  FINDINGS: Within the lower abdomen and pelvis there is a large multiloculated cystic lesion measuring 25.6 x 14.2 x 20.5 cm. Numerous irregular internal septa are noted. Several of these are mildly thickened and there is the suggestion of subtle mural nodularity. On the T1-weighted images these locules demonstrate variable signal intensity from markedly hyperintense to hypointense giving a \"stained glass\" appearance. 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 This is believed to be originating from the right ovary. The left ovary is poorly visualized and favored to be atrophic. The endometrium is abnormal in appearance. There is 7 mm of fluid centrally within the endometrial cavity. Both sides of the endometrium combine to an overall thickness of 13 mm. There is a small amount of free fluid within the pelvis. No adenopathy is identified. IMPRESSION:  1. There is a large 25.6 cm multiloculated cystic neoplasm within the pelvis. This is felt to be originating from the right ovary.  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 2. The endometrium is abnormally thickened at 13 mm. There is also fluid distending the endometrial cavity. Whether this represents endometrial hyperplasia versus endometrial malignancy is indeterminate.     Pelvic ultrasound 3/3/2020:  FINDINGS:  UTERUS:  Size (cm): Length: 7.3  AP: 2.1 Width: 3.8  Myometrial Echo Pattern: Normal, suboptimally visualized. Contour: Normal.  ENDOMETRIAL ECHO COMPLEX: The endometrial cavity is normally collapsed with no fluid distention. Width: 4.6 mm. Please note that there is an endocervical hypoechoic lesion measuring 0.9 x 0.6 cm without definite internal flow. RIGHT OVARY:  Right ovary not visualized, either obscured by overlying viscera or surgically absent (to be correlated with surgical history). LEFT OVARY:  Left ovary not visualized, either obscured by overlying viscera or surgically absent (to be correlated with surgical history). OVARIAN DOPPLER: Not applicable as neither ovary was visualized. See above. ADNEXAL MASSES: There is a new cystic mass that is not fully visualized in the midline of the pelvis. The visualized portion measures 17.3 x 14.5 x 22.5 cm. There are thick internal septations with vascularity. There are also internal echoes that may represent mucinous material but remain indeterminate. This finding is highly suspicious for an ovarian neoplasm. However, please note that no normal ovarian tissue is identified. IMPRESSION:  1. New partially visualized cystic pelvic mass measuring at least up to 22.5 cm that is favored to represent an ovarian neoplasm. However, no normal ovarian tissue is identified on this examination, as the ovaries are likely obscured by the mass. MRI would be helpful for further evaluation. 2. Endocervical 0.9 cm lesion without definite internal flow. This finding remains concerning for an endocervical polyp or cervical neoplasm. 3. Borderline thickened endometrium measuring 5 mm.     Reviewed patient's course to date, including her recent normal EGD and colonoscopy. Given her elevated CEA and Ca 19-9 I am still concerned for a possible GI malignancy, but I have recommended proceeding with resection. 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 Her pathology report is within [facility] and states no evidence of malignancy at that time. In regard to her PMB, I believe this is likely the least of her problems; although we will plan to proceed with a hysterectomy at the time of her surgery with frozen pathology as well. The patient has some difficulty comprehending all of these things, and today's visit was mostly voiced with the help of her sister. CT A/P  On 5/4/2020 demonstrates the large complex mass, but otherwise does not see any other concerning findings. I have recommended proceeding with an exploratory laparotomy, resection of mass, STEPHANY/BSO, possible staging/debulking based on frozen pathology at the time of surgery. The patient is not sure today if she would want to undergo a surgery, which is not ideal but understandable given her age. She would like to discuss this over with her sister and niece and then will contact our office for further follow-up or scheduling of her surgery. We will plan to touch based next week. All questions and concerns were addressed with the patient and she is comfortable with the plan. She was taken to the OR on 6/29/30 for exploratory laparotomy, resection of bleeding, ureterolysis of the left ureter, total abdominal hysterectomy with bilateral salpingo-oophorectomy, omentectomy. On exploratory laparotomy there was a 30cm mass arising from the left ovary. The mass had increased vascularity coming from the uterine vessels. This area was very fibrotic requiring urterolysis of the left ureter, as the ureter was abutting against the mass.  The uterus was normal size, and the right tube and ovary were normal appearing. Prior ileo-cecal resection visualized. Normal appearing omentum, peritoneum, liver surface, small bowel, and colon. 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 As noted previously, the appendix was surgically absent. Frozen pathology of the endometrium demonstrated benign endometrial polyps.     Her postoperative course was uneventful and was felt ready for discharge on POD #5. At that point she was tolerating a regular diet, she was voiding without difficulty, having flatus and BMs, and her pain was controlled with PO meds. She was to be discharged home with her niece, who is an RN. Consults: None    Significant Diagnostic Studies:     FINAL PATHOLOGIC DIAGNOSIS   1. 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 Uterus, right tube and ovary; hysterectomy and right salpingo-oophorectomy, 68 g:   Benign endometrial polyps, three   Benign endocervical polyp   Leiomyomata, intramyometrial and subserosal, up to 2 cm in greatest individual dimension   Benign right fallopian tube and ovary   Negative for malignancy   3. Right paracolic gutter, biopsy:   Negative for malignancy   4. Left paracolic gutter, biopsy:   Negative for malignancy   5. 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 Disposition: home    Discharge Medications:   Cannot display discharge medications since this patient is not currently admitted. Activity: Activity as tolerated  Diet: Regular Diet  Wound Care: Keep wound clean and dry    Follow-up Appointments   Procedures    FOLLOW UP VISIT Appointment in: Two Weeks For staples     For staples     Standing Status:   Standing     Number of Occurrences:   1     Order Specific Question:   Appointment in     Answer:    Two Weeks       Signed By: Pam Gonzalez MD     July 12, 2020

## 2020-07-13 ENCOUNTER — OFFICE VISIT (OUTPATIENT)
Dept: GYNECOLOGY | Age: 75
End: 2020-07-13

## 2020-07-13 DIAGNOSIS — Z48.02: Primary | ICD-10-CM

## 2020-07-13 NOTE — PROGRESS NOTES
Staple Removal Nursing Note:     Staples removed without difficulty, steri strips applied. Reviewed wound care, diet, and bowel regimen. There was not evidence of infection. Wound edges were well approximated. 4 week (per Dr. Rubin Anthony) surgical follow up appointment made.

## 2020-08-11 NOTE — PROGRESS NOTES
Post op Visit, surgery was on 6/29/2020    1. Have you been to the ER, urgent care clinic since your last visit? Hospitalized since your last visit?  no    2. Have you seen or consulted any other health care providers outside of the [address] [place] [address] since your last visit? Include any pap smears or colon screening.    no

## 2020-08-12 ENCOUNTER — OFFICE VISIT (OUTPATIENT)
Dept: GYNECOLOGY | Age: 75
End: 2020-08-12
Payer: MEDICARE

## 2020-08-12 VITALS
SYSTOLIC BLOOD PRESSURE: 121 MMHG | HEIGHT: 66 IN | WEIGHT: 132 LBS | DIASTOLIC BLOOD PRESSURE: 72 MMHG | HEART RATE: 70 BPM | TEMPERATURE: 97 F | BODY MASS INDEX: 21.21 KG/M2

## 2020-08-12 DIAGNOSIS — C56.9 MUCINOUS CYSTADENOMA, BORDERLINE MALIGNANCY (HCC): Primary | ICD-10-CM

## 2020-08-12 PROCEDURE — 99024 POSTOP FOLLOW-UP VISIT: CPT | Performed by: OBSTETRICS & GYNECOLOGY

## 2020-08-12 NOTE — PROGRESS NOTES
Alana Gaytan is a 76 y.o. female who was seen by synchronous (real-time) audio-video technology on 2020    [address] [facility],[address] [facility] [address]  P [phone]  F [phone]    Office Note  Patient ID:  Name:  Alana Gaytan  MRN:  [id number]  :  1945/75 y.o. Date:  2020      HISTORY OF PRESENT ILLNESS:  Ms. Alana Gaytan is a 76 y.o. female who presented with a 25cm pelvic mass, PMB, elevated CEA, and elevated Ca 19-9. On 2020 underwent Exploratory laparotomy, resection of bleeding, ureterolysis of the left ureter, total abdominal hysterectomy with bilateral salpingo-oophorectomy, omentectomy. 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 Presents today for postoperative visit. Doing well without complaints. Initial History:   Ms. Alana Gaytan is a 76 y.o.  postmenopausal female who presents in consultation from Dr. Marvin Thomas for PMB and large 25cm pelvic mass. The patient reports vaginal spotting that started in 2019 and has continued since that time. Denies bleeding like a period or everyday. Denies abdominal pain. Reports some bloating. Denies nausea or vomiting. Reports some constipation. Denies hematochezia. Unclear if change in stool caliber, but it may be thinner. 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 I do not have record of this. Reports last colonoscopy in ? [facility] Willingham Denies hematuria. Denies CP, SOB, fevers, or chills. The patient lives with her son and grandson. She is completely independent though at home. She presented to Dr. Marvin Thomas with reports of PMB and underwent a pelvic ultrasound on 3/3/2020 which demonstrated a 25cm pelvic mass and thickened endometrium of 5mm.  She subsequently underwent a pelvic MRI on 2020 which noted a \"25.6cm multiloculated cystic neoplasm with the pelvis\". She underwent tumor markers on 3/27/2020. CEA elevated at 16.3, Ca 19-9 elevated at 761; Ca-125 normal at 18.8. She was subsequently referred to our office for further management. .     Pertinent PMH/PSH: h/o bowel resection (benign tumor?), HTN, ? Early dementia      Active, no restrictions. Pathology Review:   6/29/2020:   FINAL PATHOLOGIC DIAGNOSIS   1. 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 Uterus, right tube and ovary; hysterectomy and right salpingo-oophorectomy, 68 g:   Benign endometrial polyps, three   Benign endocervical polyp   Leiomyomata, intramyometrial and subserosal, up to 2 cm in greatest individual dimension   Benign right fallopian tube and ovary   Negative for malignancy   3. Right paracolic gutter, biopsy:   Negative for malignancy   4. Left paracolic gutter, biopsy:   Negative for malignancy   5. 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      Imaging Review:   Pelvic MRI 4/14/2020:  FINDINGS: Within the lower abdomen and pelvis there is a large multiloculated cystic lesion measuring 25.6 x 14.2 x 20.5 cm. Numerous irregular internal septa are noted. Several of these are mildly thickened and there is the suggestion of subtle mural nodularity. On the T1-weighted images these locules demonstrate variable signal intensity from markedly hyperintense to hypointense giving a \"stained glass\" appearance. 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 This is believed to be originating from the right ovary. The left ovary is poorly visualized and favored to be atrophic. The endometrium is abnormal in appearance. There is 7 mm of fluid centrally within the endometrial cavity. Both sides of the endometrium combine to an overall thickness of 13 mm. There is a small amount of free fluid within the pelvis. No adenopathy is identified. IMPRESSION:  1. There is a large 25.6 cm multiloculated cystic neoplasm within the pelvis. This is felt to be originating from the right ovary. 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 2. The endometrium is abnormally thickened at 13 mm. There is also fluid distending the endometrial cavity. Whether this represents endometrial hyperplasia versus endometrial malignancy is indeterminate. Pelvic ultrasound 3/3/2020:  FINDINGS:  UTERUS:  Size (cm): Length: 7.3  AP: 2.1 Width: 3.8  Myometrial Echo Pattern: Normal, suboptimally visualized. Contour: Normal.  ENDOMETRIAL ECHO COMPLEX: The endometrial cavity is normally collapsed with no fluid distention. Width: 4.6 mm. Please note that there is an endocervical hypoechoic lesion measuring 0.9 x 0.6 cm without definite internal flow. RIGHT OVARY:  Right ovary not visualized, either obscured by overlying viscera or surgically absent (to be correlated with surgical history).   LEFT OVARY:  Left ovary not visualized, either obscured by overlying viscera or surgically absent (to be correlated with surgical history). OVARIAN DOPPLER: Not applicable as neither ovary was visualized. See above. ADNEXAL MASSES: There is a new cystic mass that is not fully visualized in the midline of the pelvis. The visualized portion measures 17.3 x 14.5 x 22.5 cm. There are thick internal septations with vascularity. There are also internal echoes that may represent mucinous material but remain indeterminate. This finding is highly suspicious for an ovarian neoplasm. However, please note that no normal ovarian tissue is identified. IMPRESSION:  1. New partially visualized cystic pelvic mass measuring at least up to 22.5 cm that is favored to represent an ovarian neoplasm. However, no normal ovarian tissue is identified on this examination, as the ovaries are likely obscured by the mass. MRI would be helpful for further evaluation. 2. Endocervical 0.9 cm lesion without definite internal flow. This finding remains concerning for an endocervical polyp or cervical neoplasm. 3. Borderline thickened endometrium measuring 5 mm. ROS:  A comprehensive review of systems was negative except for that written in the History of Present Illness.  , 10 point ROS    OB/GYN ROS:  Per HPI    ECOG stGstrstastdstest:st st1st Problem List:  Patient Active Problem List    Diagnosis Date Noted    Mucinous cystadenoma, borderline malignancy 08/12/2020    Pelvic mass in female 06/29/2020    Pelvic mass 04/24/2020    Elevated CEA 04/24/2020    Elevated CA 19-9 level 04/24/2020    PMB (postmenopausal bleeding) 04/24/2020    Hypothyroidism     Hypertension     Hypercholesteremia     High cholesterol     GERD (gastroesophageal reflux disease)     Depression      PMH:  Past Medical History:   Diagnosis Date    Depression     GERD (gastroesophageal reflux disease)     High cholesterol     Hypercholesteremia     Hypertension     Hypothyroidism       PSH:  Past Surgical History: Procedure Laterality Date    HX OTHER SURGICAL  2009    Tumor removed from colon, benign    HX TUBAL LIGATION        Social History:  Social History     Tobacco Use    Smoking status: Never Smoker    Smokeless tobacco: Never Used   Substance Use Topics    Alcohol use: Not Currently      Family History:  Family History   Problem Relation Age of Onset    Breast Cancer Mother     Depression Mother       Medications: (reviewed)  Current Outpatient Medications   Medication Sig    cholecalciferol (VITAMIN D3) 25 mcg (1,000 unit) cap Take 1,000 Units by mouth daily.  levothyroxine (SYNTHROID) 75 mcg tablet TK 1 T PO DAILY    magnesium oxide 400 mg magnesium tab 400 mg daily.  oxybutynin chloride XL (DITROPAN XL) 10 mg CR tablet Take 10 mg by mouth daily.  PARoxetine (PAXIL) 20 mg tablet Take 20 mg by mouth Every morning.  ascorbic acid (VITAMIN C PO) Take  by mouth.  docusate sodium (Colace) 100 mg capsule Take 1 Cap by mouth two (2) times a day. Hold for loose stools.  ibuprofen (MOTRIN) 200 mg tablet Take 3 Tabs by mouth every eight (8) hours as needed for Pain. No current facility-administered medications for this visit. Allergies: (reviewed)  No Known Allergies     Gyn History:   Last pap: unsure of when  History of abnormal pap: denies      OBJECTIVE:    Physical Exam:  Visit Vitals  /72 (BP 1 Location: Left arm, BP Patient Position: Sitting)   Pulse 70   Temp 97 °F (36.1 °C) (Temporal)   Ht 5' 6\" (1.676 m)   Wt 132 lb (59.9 kg)   BMI 21.31 kg/m²      General: Alert and oriented. No acute distress. Well-nourished  HEENT: No thyroid enlargment. Neck supple without restrictions. Sclera normal. Normal occular motion. Moist mucous membranes. Lymphatics: No evidence of axillary, cervical, or subclavicular adenopathy. Respiratory: clear to auscultation and percussion to the bases. No CVAT. Cardiovascular: regular rate and rhythm.  No murmurs, rubs, or gallops. Gastrointestinal: soft, non-tender, non-distended, no masses or organomegaly. Well-healed incision. Musculoskeletal: normal gait. No joint tenderness, deformity or swelling. No muscular tenderness. Extremities: extremities normal, atraumatic, no cyanosis or edema. Pelvic: exam chaperoned by nurse. Normal appearing external genitalia. On speculum exam, the vagina is atrophic. The vaginal cuff is healing well and intact. The uterus and cervix are surgically absent. No evidence of masses or nodularity on bimanual exam. Deferred rectovaginal exam.   Neuro: Grossly intact. Normal gait and movement. No acute deficit  Skin: No evidence of rashes or skin changes. IMPRESSION/PLAN:    Ms. Eve Dacosta is a 76 y.o. female who presented with a 25cm pelvic mass, PMB, elevated CEA, and elevated Ca 19-9. On 6/29/2020 underwent Exploratory laparotomy, resection of bleeding, ureterolysis of the left ureter, total abdominal hysterectomy with bilateral salpingo-oophorectomy, omentectomy. 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 Problems:     Patient Active Problem List    Diagnosis Date Noted    Mucinous cystadenoma, borderline malignancy 08/12/2020    Pelvic mass in female 06/29/2020    Pelvic mass 04/24/2020    Elevated CEA 04/24/2020    Elevated CA 19-9 level 04/24/2020    PMB (postmenopausal bleeding) 04/24/2020    Hypothyroidism     Hypertension     Hypercholesteremia     High cholesterol     GERD (gastroesophageal reflux disease)     Depression      Reviewed patient's course to date, including pathology consistent with mucinous borderline tumor of the left ovary. Normal EGD and colonoscopy pre-operatively. Surgically absent appendix at the time of surgery. Patient is healing well from surgery. Recommended q6 months surveillance. She will alternate between our office and her ObGyn Dr. Laura Shane.  Reviewed precautionary symptoms to return sooner. All questions and concerns were addressed with the patient and she is comfortable with the plan.      Parveen Foy MD

## 2020-08-12 NOTE — LETTER
8/12/20 Patient: Arabella Campos YOB: 1945 Date of Visit: 8/12/2020 [address] [address] VIA Facsimile: [phone] Dear Dandre Howard DO, Thank you for referring Ms. Ivonne Castillo to [address] [place] [address] for evaluation. My notes for this consultation are attached. If you have questions, please do not hesitate to call me. I look forward to following your patient along with you.  
 
 
Sincerely, 
 
Stephan Magdaleno MD

## 2022-03-18 PROBLEM — R97.0 ELEVATED CEA: Status: ACTIVE | Noted: 2020-04-24

## 2022-03-19 PROBLEM — R19.00 PELVIC MASS IN FEMALE: Status: ACTIVE | Noted: 2020-06-29

## 2022-03-19 PROBLEM — R19.00 PELVIC MASS: Status: ACTIVE | Noted: 2020-04-24

## 2022-03-19 PROBLEM — N95.0 PMB (POSTMENOPAUSAL BLEEDING): Status: ACTIVE | Noted: 2020-04-24

## 2022-03-19 PROBLEM — C56.9 MUCINOUS CYSTADENOMA, BORDERLINE MALIGNANCY (HCC): Status: ACTIVE | Noted: 2020-08-12

## 2022-03-20 PROBLEM — R97.8 ELEVATED CA 19-9 LEVEL: Status: ACTIVE | Noted: 2020-04-24

## 2023-05-23 RX ORDER — PSEUDOEPHEDRINE HCL 30 MG
100 TABLET ORAL 2 TIMES DAILY
COMMUNITY
Start: 2020-06-30

## 2023-05-23 RX ORDER — PAROXETINE HYDROCHLORIDE 20 MG/1
20 TABLET, FILM COATED ORAL EVERY MORNING
COMMUNITY

## 2023-05-23 RX ORDER — LEVOTHYROXINE SODIUM 0.07 MG/1
TABLET ORAL
COMMUNITY
Start: 2020-03-09

## 2023-05-23 RX ORDER — IBUPROFEN 200 MG
600 TABLET ORAL EVERY 8 HOURS PRN
COMMUNITY
Start: 2020-06-30

## 2023-05-23 RX ORDER — OXYBUTYNIN CHLORIDE 10 MG/1
10 TABLET, EXTENDED RELEASE ORAL DAILY
COMMUNITY

## 2023-05-23 RX ORDER — MAGNESIUM OXIDE 400 MG/1
400 TABLET ORAL DAILY
COMMUNITY

## (undated) DEVICE — Z DISCONTINUED USE 2744636  DRESSING AQUACEL 14 IN ALG W3.5XL14IN POLYUR FLM CVR W/ HYDRCOLL

## (undated) DEVICE — TOTAL TRAY, DB, 100% SILI FOLEY, 16FR 10: Brand: MEDLINE

## (undated) DEVICE — STERILE POLYISOPRENE POWDER-FREE SURGICAL GLOVES WITH EMOLLIENT COATING: Brand: PROTEXIS

## (undated) DEVICE — COVER LT HNDL PLAS RIG 1 PER PK

## (undated) DEVICE — SUTURE VCRL SZ 2-0 L36IN ABSRB UD L36MM CT-1 1/2 CIR J945H

## (undated) DEVICE — SURGICAL PROCEDURE PACK GYN ONCOLOGY CUST ST MARYS LF

## (undated) DEVICE — DRAPE, LAVH, STERILE: Brand: MEDLINE

## (undated) DEVICE — Device

## (undated) DEVICE — BLADE ELECTRODE: Brand: EDGE

## (undated) DEVICE — GARMENT,MEDLINE,DVT,INT,CALF,MED, GEN2: Brand: MEDLINE

## (undated) DEVICE — DERMABOND SKIN ADH 0.7ML -- DERMABOND ADVANCED 12/BX

## (undated) DEVICE — TRAY PREP DRY W/ PREM GLV 2 APPL 6 SPNG 2 UNDPD 1 OVERWRAP

## (undated) DEVICE — ROCKER SWITCH PENCIL BLADE ELECTRODE, HOLSTER: Brand: EDGE

## (undated) DEVICE — STERILE NEOPRENE POWDER-FREE SURGICAL GLOVES WITH NITRILE COATING: Brand: PROTEXIS

## (undated) DEVICE — CURVED, LARGE JAW, OPEN SEALER/DIVIDER NANO-COATED: Brand: LIGASURE IMPACT

## (undated) DEVICE — Z INACTIVE USE 2527070 DRAPE SURG W40XL44IN UNDERBUTTOCK SMS POLYPR W/ PCH BK DISP

## (undated) DEVICE — SURGICAL PROCEDURE PACK BASIN MAJ SET CUST NO CAUT

## (undated) DEVICE — INFECTION CONTROL KIT SYS

## (undated) DEVICE — REM POLYHESIVE ADULT PATIENT RETURN ELECTRODE: Brand: VALLEYLAB

## (undated) DEVICE — AGENT HEMSTAT 3GM PURIFIED PLNT STARCH PWD ABSRB ARISTA AH

## (undated) DEVICE — SOLUTION IV 1000ML 0.9% SOD CHL

## (undated) DEVICE — PAD,SANITARY,11 IN,MAXI,N-STRL,IND WRAP: Brand: MEDLINE

## (undated) DEVICE — SUTURE VCRL SZ 0 L36IN ABSRB VLT L36MM CT-1 1/2 CIR J346H

## (undated) DEVICE — TOWEL SURG W17XL27IN STD BLU COT NONFENESTRATED PREWASHED

## (undated) DEVICE — LEGGINGS: Brand: CONVERTORS

## (undated) DEVICE — PREP SKN CHLRAPRP APL 26ML STR --

## (undated) DEVICE — STRAP,POSITIONING,KNEE/BODY,FOAM,4X60": Brand: MEDLINE

## (undated) DEVICE — SUTURE PDS II SZ 1 L96IN ABSRB VLT TP-1 L65MM 1/2 CIR Z880G

## (undated) DEVICE — SOLUTION IRRIG 1000ML H2O STRL BLT

## (undated) DEVICE — BLADE ASSEMB CLP HAIR FINE --

## (undated) DEVICE — DRAPE FLD WRM W44XL66IN C6L FOR INTRATEMP SYS THERMABASIN

## (undated) DEVICE — SPONGE LAP 18X18IN STRL -- 5/PK